# Patient Record
Sex: FEMALE | Race: WHITE | NOT HISPANIC OR LATINO | Employment: OTHER | URBAN - METROPOLITAN AREA
[De-identification: names, ages, dates, MRNs, and addresses within clinical notes are randomized per-mention and may not be internally consistent; named-entity substitution may affect disease eponyms.]

---

## 2017-12-06 ENCOUNTER — HOSPITAL ENCOUNTER (OUTPATIENT)
Dept: RADIOLOGY | Facility: HOSPITAL | Age: 62
Discharge: HOME | End: 2017-12-06
Attending: OBSTETRICS & GYNECOLOGY
Payer: COMMERCIAL

## 2017-12-06 VITALS — WEIGHT: 135 LBS | HEIGHT: 65 IN | BODY MASS INDEX: 22.49 KG/M2

## 2017-12-06 DIAGNOSIS — Z13.820 ENCOUNTER FOR IMAGING TO ASSESS OSTEOPOROSIS CHANGE: ICD-10-CM

## 2017-12-06 DIAGNOSIS — Z12.39 ENCOUNTER FOR SPECIAL SCREENING EXAMINATION FOR NEOPLASM OF BREAST: ICD-10-CM

## 2017-12-06 PROCEDURE — 77067 SCR MAMMO BI INCL CAD: CPT | Mod: 26,,, | Performed by: RADIOLOGY

## 2017-12-06 PROCEDURE — 77080 DXA BONE DENSITY AXIAL: CPT | Mod: TC,PO

## 2017-12-06 PROCEDURE — 77063 BREAST TOMOSYNTHESIS BI: CPT | Mod: 26,,, | Performed by: RADIOLOGY

## 2017-12-06 PROCEDURE — 77080 DXA BONE DENSITY AXIAL: CPT | Mod: 26,,, | Performed by: RADIOLOGY

## 2017-12-06 PROCEDURE — 77067 SCR MAMMO BI INCL CAD: CPT | Mod: TC,PO

## 2019-08-16 ENCOUNTER — HOSPITAL ENCOUNTER (OUTPATIENT)
Dept: RADIOLOGY | Facility: HOSPITAL | Age: 64
Discharge: HOME | End: 2019-08-16
Attending: OBSTETRICS & GYNECOLOGY
Payer: MEDICARE

## 2019-08-16 VITALS — BODY MASS INDEX: 22.48 KG/M2 | WEIGHT: 134.94 LBS | HEIGHT: 65 IN

## 2019-08-16 DIAGNOSIS — Z12.39 BREAST CANCER SCREENING: ICD-10-CM

## 2019-08-16 PROCEDURE — 77067 SCR MAMMO BI INCL CAD: CPT | Mod: TC,PO

## 2019-08-16 PROCEDURE — 77067 SCR MAMMO BI INCL CAD: CPT | Mod: 26,,, | Performed by: RADIOLOGY

## 2019-08-16 PROCEDURE — 77063 MAMMO DIGITAL SCREENING BILAT WITH TOMOSYNTHESIS_CAD: ICD-10-PCS | Mod: 26,,, | Performed by: RADIOLOGY

## 2019-08-16 PROCEDURE — 77063 BREAST TOMOSYNTHESIS BI: CPT | Mod: 26,,, | Performed by: RADIOLOGY

## 2019-08-16 PROCEDURE — 77067 MAMMO DIGITAL SCREENING BILAT WITH TOMOSYNTHESIS_CAD: ICD-10-PCS | Mod: 26,,, | Performed by: RADIOLOGY

## 2020-10-07 ENCOUNTER — HOSPITAL ENCOUNTER (OUTPATIENT)
Dept: RADIOLOGY | Facility: HOSPITAL | Age: 65
Discharge: HOME | End: 2020-10-07
Payer: MEDICARE

## 2020-10-07 DIAGNOSIS — Z12.31 ENCOUNTER FOR SCREENING MAMMOGRAM FOR MALIGNANT NEOPLASM OF BREAST: ICD-10-CM

## 2020-10-07 PROCEDURE — 77067 MAMMO DIGITAL SCREENING BILAT WITH TOMO: ICD-10-PCS | Mod: 26,,, | Performed by: RADIOLOGY

## 2020-10-07 PROCEDURE — 77067 SCR MAMMO BI INCL CAD: CPT | Mod: TC,PO

## 2020-10-07 PROCEDURE — 77067 SCR MAMMO BI INCL CAD: CPT | Mod: 26,,, | Performed by: RADIOLOGY

## 2020-10-07 PROCEDURE — 77063 MAMMO DIGITAL SCREENING BILAT WITH TOMO: ICD-10-PCS | Mod: 26,,, | Performed by: RADIOLOGY

## 2020-10-07 PROCEDURE — 77063 BREAST TOMOSYNTHESIS BI: CPT | Mod: 26,,, | Performed by: RADIOLOGY

## 2020-11-03 ENCOUNTER — TELEPHONE (OUTPATIENT)
Dept: FAMILY MEDICINE | Facility: CLINIC | Age: 65
End: 2020-11-03

## 2020-11-03 NOTE — TELEPHONE ENCOUNTER
----- Message from Polly Pollard sent at 11/3/2020  1:09 PM CST -----  Contact: call  pt [phone]   Type:  Sooner Apoointment Request    Caller is requesting a sooner appointment.  Caller declined first available appointment listed below.  Caller will not accept being placed on the waitlist and is requesting a message be sent to doctor.    Name of Caller: pt  When is the first available appointment?   No   schedule // coming up //  supervisor   was  not able to  also  Symptoms:    est care   would  like to be a  new pt  Best Call Back Number:  call  pt [phone]  Additional Information:    please   call to   fit  pt in

## 2021-02-11 ENCOUNTER — OFFICE VISIT (OUTPATIENT)
Dept: FAMILY MEDICINE | Facility: CLINIC | Age: 66
End: 2021-02-11
Payer: MEDICARE

## 2021-02-11 VITALS
WEIGHT: 153.25 LBS | SYSTOLIC BLOOD PRESSURE: 136 MMHG | BODY MASS INDEX: 25.53 KG/M2 | TEMPERATURE: 97 F | RESPIRATION RATE: 18 BRPM | HEIGHT: 65 IN | DIASTOLIC BLOOD PRESSURE: 78 MMHG | OXYGEN SATURATION: 98 % | HEART RATE: 77 BPM

## 2021-02-11 DIAGNOSIS — R35.0 URINARY FREQUENCY: ICD-10-CM

## 2021-02-11 DIAGNOSIS — R00.2 PALPITATIONS: Primary | ICD-10-CM

## 2021-02-11 PROCEDURE — 99203 OFFICE O/P NEW LOW 30 MIN: CPT | Mod: S$GLB,,, | Performed by: FAMILY MEDICINE

## 2021-02-11 PROCEDURE — 99203 PR OFFICE/OUTPT VISIT, NEW, LEVL III, 30-44 MIN: ICD-10-PCS | Mod: S$GLB,,, | Performed by: FAMILY MEDICINE

## 2021-02-11 PROCEDURE — 81000 URINALYSIS NONAUTO W/SCOPE: CPT | Mod: PO

## 2021-02-11 RX ORDER — AZELASTINE 1 MG/ML
2 SPRAY, METERED NASAL 2 TIMES DAILY
Qty: 30 ML | Refills: 5 | Status: SHIPPED | OUTPATIENT
Start: 2021-02-11 | End: 2022-02-23

## 2021-02-11 RX ORDER — ESTRADIOL 0.03 MG/D
FILM, EXTENDED RELEASE TRANSDERMAL
COMMUNITY
Start: 2021-01-16

## 2021-02-11 RX ORDER — PROGESTERONE 100 MG/1
100 CAPSULE ORAL NIGHTLY
COMMUNITY
Start: 2020-12-03

## 2021-02-12 LAB
BACTERIA #/AREA URNS HPF: ABNORMAL /HPF
BILIRUB UR QL STRIP: NEGATIVE
CLARITY UR: CLEAR
COLOR UR: YELLOW
GLUCOSE UR QL STRIP: NEGATIVE
HGB UR QL STRIP: ABNORMAL
KETONES UR QL STRIP: NEGATIVE
LEUKOCYTE ESTERASE UR QL STRIP: ABNORMAL
MICROSCOPIC COMMENT: ABNORMAL
NITRITE UR QL STRIP: NEGATIVE
PH UR STRIP: 7 [PH] (ref 5–8)
PROT UR QL STRIP: NEGATIVE
RBC #/AREA URNS HPF: 0 /HPF (ref 0–4)
SP GR UR STRIP: 1.01 (ref 1–1.03)
SQUAMOUS #/AREA URNS HPF: 3 /HPF
URN SPEC COLLECT METH UR: ABNORMAL
WBC #/AREA URNS HPF: 3 /HPF (ref 0–5)

## 2021-02-15 ENCOUNTER — PATIENT MESSAGE (OUTPATIENT)
Dept: FAMILY MEDICINE | Facility: CLINIC | Age: 66
End: 2021-02-15

## 2021-02-17 DIAGNOSIS — Z12.11 COLON CANCER SCREENING: ICD-10-CM

## 2021-02-22 ENCOUNTER — PATIENT OUTREACH (OUTPATIENT)
Dept: ADMINISTRATIVE | Facility: HOSPITAL | Age: 66
End: 2021-02-22

## 2021-03-09 ENCOUNTER — OFFICE VISIT (OUTPATIENT)
Dept: CARDIOLOGY | Facility: CLINIC | Age: 66
End: 2021-03-09
Payer: MEDICARE

## 2021-03-09 VITALS
BODY MASS INDEX: 27.46 KG/M2 | SYSTOLIC BLOOD PRESSURE: 173 MMHG | HEIGHT: 63 IN | DIASTOLIC BLOOD PRESSURE: 93 MMHG | WEIGHT: 155 LBS | HEART RATE: 77 BPM

## 2021-03-09 DIAGNOSIS — R00.2 PALPITATIONS: Primary | ICD-10-CM

## 2021-03-09 PROCEDURE — 99999 PR PBB SHADOW E&M-EST. PATIENT-LVL III: ICD-10-PCS | Mod: PBBFAC,,, | Performed by: INTERNAL MEDICINE

## 2021-03-09 PROCEDURE — 99204 OFFICE O/P NEW MOD 45 MIN: CPT | Mod: S$PBB,,, | Performed by: INTERNAL MEDICINE

## 2021-03-09 PROCEDURE — 93010 EKG 12-LEAD: ICD-10-PCS | Mod: S$PBB,,, | Performed by: INTERNAL MEDICINE

## 2021-03-09 PROCEDURE — 99999 PR PBB SHADOW E&M-EST. PATIENT-LVL III: CPT | Mod: PBBFAC,,, | Performed by: INTERNAL MEDICINE

## 2021-03-09 PROCEDURE — 99213 OFFICE O/P EST LOW 20 MIN: CPT | Mod: PBBFAC,PO,25 | Performed by: INTERNAL MEDICINE

## 2021-03-09 PROCEDURE — 93005 ELECTROCARDIOGRAM TRACING: CPT | Mod: PBBFAC,PO | Performed by: INTERNAL MEDICINE

## 2021-03-09 PROCEDURE — 93010 ELECTROCARDIOGRAM REPORT: CPT | Mod: S$PBB,,, | Performed by: INTERNAL MEDICINE

## 2021-03-09 PROCEDURE — 99204 PR OFFICE/OUTPT VISIT, NEW, LEVL IV, 45-59 MIN: ICD-10-PCS | Mod: S$PBB,,, | Performed by: INTERNAL MEDICINE

## 2021-03-15 LAB
CHOLEST SERPL-MSCNC: 198 MG/DL (ref 0–200)
HDLC SERPL-MCNC: 71 MG/DL (ref 35–70)
HEPATITIS C VIRUS ANTIBODY: <0.1
LDLC SERPL CALC-MCNC: 115 MG/DL (ref 0–160)
TRIGL SERPL-MCNC: 67 MG/DL (ref 40–160)

## 2021-03-22 ENCOUNTER — HOSPITAL ENCOUNTER (OUTPATIENT)
Dept: CARDIOLOGY | Facility: HOSPITAL | Age: 66
Discharge: HOME | End: 2021-03-22
Attending: INTERNAL MEDICINE
Payer: MEDICARE

## 2021-03-22 DIAGNOSIS — R00.2 PALPITATIONS: ICD-10-CM

## 2021-03-22 PROCEDURE — 93248 CV CARDIAC MONITOR - 3-14 DAY ADULT (CUPID ONLY): ICD-10-PCS | Mod: ,,, | Performed by: INTERNAL MEDICINE

## 2021-03-22 PROCEDURE — 93248 EXT ECG>7D<15D REV&INTERPJ: CPT | Mod: ,,, | Performed by: INTERNAL MEDICINE

## 2021-03-22 PROCEDURE — 93246 EXT ECG>7D<15D RECORDING: CPT | Mod: PO

## 2021-03-22 PROCEDURE — 93247 EXT ECG>7D<15D SCAN A/R: CPT | Mod: PO

## 2021-03-23 ENCOUNTER — TELEPHONE (OUTPATIENT)
Dept: CARDIOLOGY | Facility: CLINIC | Age: 66
End: 2021-03-23

## 2021-03-31 ENCOUNTER — PATIENT MESSAGE (OUTPATIENT)
Dept: FAMILY MEDICINE | Facility: CLINIC | Age: 66
End: 2021-03-31

## 2021-04-05 ENCOUNTER — PATIENT OUTREACH (OUTPATIENT)
Dept: ADMINISTRATIVE | Facility: HOSPITAL | Age: 66
End: 2021-04-05

## 2021-04-06 ENCOUNTER — PATIENT MESSAGE (OUTPATIENT)
Dept: ADMINISTRATIVE | Facility: HOSPITAL | Age: 66
End: 2021-04-06

## 2021-04-12 ENCOUNTER — HOSPITAL ENCOUNTER (OUTPATIENT)
Dept: RADIOLOGY | Facility: HOSPITAL | Age: 66
Discharge: HOME | End: 2021-04-12
Attending: OBSTETRICS & GYNECOLOGY
Payer: MEDICARE

## 2021-04-12 DIAGNOSIS — Z80.3 FAMILY HISTORY OF BREAST CANCER: ICD-10-CM

## 2021-04-12 PROCEDURE — 25500020 PHARM REV CODE 255: Mod: PO

## 2021-04-12 PROCEDURE — 77049 MRI BREAST W/WO CONTRAST, W/CAD, BILATERAL: ICD-10-PCS | Mod: 26,,, | Performed by: RADIOLOGY

## 2021-04-12 PROCEDURE — A9577 INJ MULTIHANCE: HCPCS | Mod: PO

## 2021-04-12 PROCEDURE — 77049 MRI BREAST C-+ W/CAD BI: CPT | Mod: 26,,, | Performed by: RADIOLOGY

## 2021-04-12 PROCEDURE — C8937 CAD BREAST MRI: HCPCS | Mod: TC,PO

## 2021-04-12 RX ADMIN — GADOBENATE DIMEGLUMINE 14 ML: 529 INJECTION, SOLUTION INTRAVENOUS at 03:04

## 2021-04-19 ENCOUNTER — CLINICAL SUPPORT (OUTPATIENT)
Dept: CARDIOLOGY | Facility: CLINIC | Age: 66
End: 2021-04-19
Attending: INTERNAL MEDICINE
Payer: MEDICARE

## 2021-04-19 VITALS — BODY MASS INDEX: 27.29 KG/M2 | HEIGHT: 63 IN | WEIGHT: 154 LBS

## 2021-04-19 DIAGNOSIS — R00.2 PALPITATIONS: ICD-10-CM

## 2021-04-19 LAB
ASCENDING AORTA: 3 CM
AV INDEX (PROSTH): 0.9
AV MEAN GRADIENT: 4 MMHG
AV PEAK GRADIENT: 7 MMHG
AV VALVE AREA: 2.75 CM2
AV VELOCITY RATIO: 0.89
BSA FOR ECHO PROCEDURE: 1.76 M2
CV ECHO LV RWT: 0.52 CM
DOP CALC AO PEAK VEL: 1.33 M/S
DOP CALC AO VTI: 29.22 CM
DOP CALC LVOT AREA: 3 CM2
DOP CALC LVOT DIAMETER: 1.97 CM
DOP CALC LVOT PEAK VEL: 1.19 M/S
DOP CALC LVOT STROKE VOLUME: 80.34 CM3
DOP CALCLVOT PEAK VEL VTI: 26.37 CM
E WAVE DECELERATION TIME: 214.76 MSEC
E/A RATIO: 0.71
E/E' RATIO: 9.33 M/S
ECHO LV POSTERIOR WALL: 1.02 CM (ref 0.6–1.1)
EJECTION FRACTION: 65 %
FRACTIONAL SHORTENING: 43 % (ref 28–44)
INTERVENTRICULAR SEPTUM: 1.04 CM (ref 0.6–1.1)
IVRT: 91.34 MSEC
LA MAJOR: 3.92 CM
LA MINOR: 5.01 CM
LA WIDTH: 3.51 CM
LEFT ATRIUM SIZE: 2.69 CM
LEFT ATRIUM VOLUME INDEX: 20.4 ML/M2
LEFT ATRIUM VOLUME: 35.3 CM3
LEFT INTERNAL DIMENSION IN SYSTOLE: 2.22 CM (ref 2.1–4)
LEFT VENTRICLE DIASTOLIC VOLUME INDEX: 37.82 ML/M2
LEFT VENTRICLE DIASTOLIC VOLUME: 65.42 ML
LEFT VENTRICLE MASS INDEX: 73 G/M2
LEFT VENTRICLE SYSTOLIC VOLUME INDEX: 9.6 ML/M2
LEFT VENTRICLE SYSTOLIC VOLUME: 16.57 ML
LEFT VENTRICULAR INTERNAL DIMENSION IN DIASTOLE: 3.89 CM (ref 3.5–6)
LEFT VENTRICULAR MASS: 126.88 G
LV LATERAL E/E' RATIO: 7.78 M/S
LV SEPTAL E/E' RATIO: 11.67 M/S
MV A" WAVE DURATION": 10.56 MSEC
MV PEAK A VEL: 0.99 M/S
MV PEAK E VEL: 0.7 M/S
PISA TR MAX VEL: 2.41 M/S
PULM VEIN S/D RATIO: 1.87
PV PEAK D VEL: 0.3 M/S
PV PEAK S VEL: 0.56 M/S
RA MAJOR: 4.42 CM
RA PRESSURE: 3 MMHG
RA WIDTH: 3.57 CM
RIGHT VENTRICULAR END-DIASTOLIC DIMENSION: 3.47 CM
RV TISSUE DOPPLER FREE WALL SYSTOLIC VELOCITY 1 (APICAL 4 CHAMBER VIEW): 14.12 CM/S
SINUS: 2.95 CM
STJ: 3.04 CM
TDI LATERAL: 0.09 M/S
TDI SEPTAL: 0.06 M/S
TDI: 0.08 M/S
TR MAX PG: 23 MMHG
TRICUSPID ANNULAR PLANE SYSTOLIC EXCURSION: 2.13 CM
TV REST PULMONARY ARTERY PRESSURE: 26 MMHG

## 2021-04-19 PROCEDURE — 93306 TTE W/DOPPLER COMPLETE: CPT | Mod: PBBFAC,PO | Performed by: INTERNAL MEDICINE

## 2021-04-19 PROCEDURE — 99211 OFF/OP EST MAY X REQ PHY/QHP: CPT | Mod: PBBFAC,PO,25

## 2021-04-19 PROCEDURE — 99999 PR PBB SHADOW E&M-EST. PATIENT-LVL I: ICD-10-PCS | Mod: PBBFAC,,,

## 2021-04-19 PROCEDURE — 99999 PR PBB SHADOW E&M-EST. PATIENT-LVL I: CPT | Mod: PBBFAC,,,

## 2021-04-19 PROCEDURE — 93306 ECHO (CUPID ONLY): ICD-10-PCS | Mod: 26,S$PBB,, | Performed by: INTERNAL MEDICINE

## 2021-04-22 ENCOUNTER — HOSPITAL ENCOUNTER (OUTPATIENT)
Dept: RADIOLOGY | Facility: HOSPITAL | Age: 66
Discharge: HOME | End: 2021-04-22
Attending: OBSTETRICS & GYNECOLOGY
Payer: MEDICARE

## 2021-04-22 DIAGNOSIS — D37.6 EPITHELIOID HEMANGIOENDOTHELIOMA LIVER: ICD-10-CM

## 2021-04-22 PROCEDURE — 76700 US EXAM ABDOM COMPLETE: CPT | Mod: 26,,, | Performed by: RADIOLOGY

## 2021-04-22 PROCEDURE — 76700 US EXAM ABDOM COMPLETE: CPT | Mod: TC,PO

## 2021-04-22 PROCEDURE — 76700 US ABDOMEN COMPLETE: ICD-10-PCS | Mod: 26,,, | Performed by: RADIOLOGY

## 2021-05-03 ENCOUNTER — PATIENT OUTREACH (OUTPATIENT)
Dept: ADMINISTRATIVE | Facility: HOSPITAL | Age: 66
End: 2021-05-03

## 2021-05-21 ENCOUNTER — HOSPITAL ENCOUNTER (OUTPATIENT)
Dept: RADIOLOGY | Facility: HOSPITAL | Age: 66
Discharge: HOME | End: 2021-05-21
Attending: OBSTETRICS & GYNECOLOGY
Payer: MEDICARE

## 2021-05-21 DIAGNOSIS — R16.0 LIVER MASS: ICD-10-CM

## 2021-05-21 PROCEDURE — 25500020 PHARM REV CODE 255: Mod: PO

## 2021-05-21 PROCEDURE — 74183 MRI ABD W/O CNTR FLWD CNTR: CPT | Mod: TC,PO

## 2021-05-21 PROCEDURE — A9585 GADOBUTROL INJECTION: HCPCS | Mod: PO

## 2021-05-21 PROCEDURE — 74183 MRI ABD W/O CNTR FLWD CNTR: CPT | Mod: 26,,, | Performed by: RADIOLOGY

## 2021-05-21 PROCEDURE — 74183 MRI ABDOMEN W WO CONTRAST: ICD-10-PCS | Mod: 26,,, | Performed by: RADIOLOGY

## 2021-05-21 RX ORDER — GADOBUTROL 604.72 MG/ML
7 INJECTION INTRAVENOUS
Status: COMPLETED | OUTPATIENT
Start: 2021-05-21 | End: 2021-05-21

## 2021-05-21 RX ADMIN — GADOBUTROL 7 ML: 604.72 INJECTION INTRAVENOUS at 11:05

## 2021-08-13 ENCOUNTER — OFFICE VISIT (OUTPATIENT)
Dept: CARDIOLOGY | Facility: CLINIC | Age: 66
End: 2021-08-13
Payer: MEDICARE

## 2021-08-13 DIAGNOSIS — R00.2 PALPITATIONS: Primary | ICD-10-CM

## 2021-08-13 PROCEDURE — 99214 OFFICE O/P EST MOD 30 MIN: CPT | Mod: 95,,, | Performed by: INTERNAL MEDICINE

## 2021-08-13 PROCEDURE — 99214 PR OFFICE/OUTPT VISIT, EST, LEVL IV, 30-39 MIN: ICD-10-PCS | Mod: 95,,, | Performed by: INTERNAL MEDICINE

## 2022-02-23 RX ORDER — AZELASTINE 1 MG/ML
2 SPRAY, METERED NASAL 2 TIMES DAILY
Qty: 90 ML | Refills: 0 | Status: SHIPPED | OUTPATIENT
Start: 2022-02-23

## 2022-03-18 ENCOUNTER — PES CALL (OUTPATIENT)
Dept: ADMINISTRATIVE | Facility: CLINIC | Age: 67
End: 2022-03-18
Payer: MEDICARE

## 2022-04-18 ENCOUNTER — TELEPHONE (OUTPATIENT)
Dept: RADIOLOGY | Facility: HOSPITAL | Age: 67
End: 2022-04-18
Payer: MEDICARE

## 2022-04-18 NOTE — TELEPHONE ENCOUNTER
Received outside paper order from Dr. Crowley for screening mammogram. Called patient, scheduled for 4/20/22 at 3 pm at [address] [facility] radiology

## 2022-04-20 ENCOUNTER — HOSPITAL ENCOUNTER (OUTPATIENT)
Dept: RADIOLOGY | Facility: HOSPITAL | Age: 67
Discharge: HOME | End: 2022-04-20
Attending: OBSTETRICS & GYNECOLOGY
Payer: MEDICARE

## 2022-04-20 DIAGNOSIS — Z12.31 BREAST CANCER SCREENING BY MAMMOGRAM: ICD-10-CM

## 2022-04-20 PROCEDURE — 77063 BREAST TOMOSYNTHESIS BI: CPT | Mod: 26,,, | Performed by: RADIOLOGY

## 2022-04-20 PROCEDURE — 77063 BREAST TOMOSYNTHESIS BI: CPT | Mod: TC,PO

## 2022-04-20 PROCEDURE — 77067 MAMMO DIGITAL SCREENING BILAT WITH TOMO: ICD-10-PCS | Mod: 26,,, | Performed by: RADIOLOGY

## 2022-04-20 PROCEDURE — 77067 SCR MAMMO BI INCL CAD: CPT | Mod: TC,PO

## 2022-04-20 PROCEDURE — 77063 MAMMO DIGITAL SCREENING BILAT WITH TOMO: ICD-10-PCS | Mod: 26,,, | Performed by: RADIOLOGY

## 2022-04-20 PROCEDURE — 77067 SCR MAMMO BI INCL CAD: CPT | Mod: 26,,, | Performed by: RADIOLOGY

## 2022-05-31 ENCOUNTER — PATIENT MESSAGE (OUTPATIENT)
Dept: ADMINISTRATIVE | Facility: HOSPITAL | Age: 67
End: 2022-05-31
Payer: MEDICARE

## 2022-06-28 ENCOUNTER — TELEPHONE (OUTPATIENT)
Dept: FAMILY MEDICINE | Facility: CLINIC | Age: 67
End: 2022-06-28
Payer: MEDICARE

## 2022-07-07 ENCOUNTER — TELEPHONE (OUTPATIENT)
Dept: CARDIOLOGY | Facility: CLINIC | Age: 67
End: 2022-07-07
Payer: MEDICARE

## 2022-07-07 NOTE — TELEPHONE ENCOUNTER
[employer] [facility] message sent to reschedule   Provider Procedure Text (E): After obtaining clear surgical margins the defect was repaired by another provider.

## 2022-08-05 NOTE — TELEPHONE ENCOUNTER
No new care gaps identified.  Powered by [employer]. Reference number: [id number].   2/22/2022 12:07:29 AM CST  
Refill Authorization Note   Paulina Agustin  is requesting a refill authorization.  Brief Assessment and Rationale for Refill:  Approve     Medication Therapy Plan:       Medication Reconciliation Completed: No   Comments:   --->Care Gap information included below if applicable.   Orders Placed This Encounter    azelastine (ASTELIN) 137 mcg (0.1 %) nasal spray      Requested Prescriptions   Signed Prescriptions Disp Refills    azelastine (ASTELIN) 137 mcg (0.1 %) nasal spray 90 mL 0     Si sprays (274 mcg total) by Nasal route 2 (two) times daily.       Ear, Nose, and Throat: Nasal Preparations - Antiallergy Passed - 2022 12:06 AM        Passed - Patient is at least 18 years old        Passed - Valid encounter within last 15 months     Recent Visits  Date Type Provider Dept   21 Office Visit Sury Stewart MD [facility]   Showing recent visits within past 720 days and meeting all other requirements  Future Appointments  No visits were found meeting these conditions.  Showing future appointments within next 150 days and meeting all other requirements      Future Appointments              In 5 months Steve Figueroa MD [place] [facility], [place]                    Appointments  past 12m or future 3m with PCP    Date Provider   Last Visit   2021 Sury Stewart MD   Next Visit   Visit date not found Sury Stewart MD   ED visits in past 90 days: 0     Note composed:1:52 PM 2022         
no

## 2022-08-31 DIAGNOSIS — Z78.0 MENOPAUSE: ICD-10-CM

## 2022-09-07 ENCOUNTER — PATIENT MESSAGE (OUTPATIENT)
Dept: ADMINISTRATIVE | Facility: HOSPITAL | Age: 67
End: 2022-09-07
Payer: MEDICARE

## 2022-09-07 ENCOUNTER — PATIENT OUTREACH (OUTPATIENT)
Dept: ADMINISTRATIVE | Facility: HOSPITAL | Age: 67
End: 2022-09-07
Payer: MEDICARE

## 2022-09-09 ENCOUNTER — TELEPHONE (OUTPATIENT)
Dept: FAMILY MEDICINE | Facility: CLINIC | Age: 67
End: 2022-09-09
Payer: MEDICARE

## 2022-09-09 DIAGNOSIS — R79.9 ABNORMAL FINDING OF BLOOD CHEMISTRY, UNSPECIFIED: ICD-10-CM

## 2022-09-09 DIAGNOSIS — R89.9 ABNORMAL LABORATORY TEST RESULT: Primary | ICD-10-CM

## 2022-09-09 NOTE — TELEPHONE ENCOUNTER
----- Message from Tiffanie Felix sent at 9/9/2022  3:00 PM CDT -----  Type:  Sooner Appointment Request    Caller is requesting a sooner appointment.  Caller declined first available appointment listed below.  Caller will not accept being placed on the waitlist and is requesting a message be sent to doctor.    Name of Caller:  pt  When is the first available appointment?  12/12  Symptoms:  need orders for blood work orders she keep passing out --would like the blood work first and then an appt so she can speak to the dr--please call and advise  Best Call Back Number:  [phone] (home)     Additional Information:  thank you       Name band;

## 2022-09-09 NOTE — TELEPHONE ENCOUNTER
Returned call to patient.  She is unable to come in next week as she will be out of town.  Was able to schedule on 9/26.  Patient had several episodes of this when she was younger but not for over thirty years.  Has now had three episodes of passing out after meals.  [facility]  CBC,CMP, LIPID, TSH, T3, T4 AND B12.  Please add if any additional labs needed.  Patient did request to have diabetes labs eval.

## 2022-09-11 ENCOUNTER — PATIENT MESSAGE (OUTPATIENT)
Dept: FAMILY MEDICINE | Facility: CLINIC | Age: 67
End: 2022-09-11
Payer: MEDICARE

## 2022-09-13 LAB
CHOLEST SERPL-MSCNC: 195 MG/DL (ref 0–200)
HBA1C MFR BLD: 5.5 % (ref 4.8–5.6)
HDLC SERPL-MCNC: 86 MG/DL (ref 35–70)
LDLC SERPL CALC-MCNC: 114 MG/DL (ref 0–160)
TRIGL SERPL-MCNC: 84 MG/DL (ref 40–160)

## 2022-09-14 ENCOUNTER — PATIENT MESSAGE (OUTPATIENT)
Dept: FAMILY MEDICINE | Facility: CLINIC | Age: 67
End: 2022-09-14
Payer: MEDICARE

## 2022-09-26 ENCOUNTER — OFFICE VISIT (OUTPATIENT)
Dept: FAMILY MEDICINE | Facility: CLINIC | Age: 67
End: 2022-09-26
Payer: MEDICARE

## 2022-09-26 ENCOUNTER — TELEPHONE (OUTPATIENT)
Dept: FAMILY MEDICINE | Facility: CLINIC | Age: 67
End: 2022-09-26

## 2022-09-26 VITALS
HEIGHT: 63 IN | TEMPERATURE: 99 F | OXYGEN SATURATION: 99 % | HEART RATE: 81 BPM | RESPIRATION RATE: 20 BRPM | BODY MASS INDEX: 27.54 KG/M2 | WEIGHT: 155.44 LBS | SYSTOLIC BLOOD PRESSURE: 126 MMHG | DIASTOLIC BLOOD PRESSURE: 78 MMHG

## 2022-09-26 DIAGNOSIS — K86.2 PANCREAS CYST: ICD-10-CM

## 2022-09-26 DIAGNOSIS — I10 HYPERTENSION, UNSPECIFIED TYPE: Primary | ICD-10-CM

## 2022-09-26 DIAGNOSIS — R55 SYNCOPE, UNSPECIFIED SYNCOPE TYPE: ICD-10-CM

## 2022-09-26 PROCEDURE — 93005 EKG 12-LEAD: ICD-10-PCS | Mod: S$GLB,,, | Performed by: FAMILY MEDICINE

## 2022-09-26 PROCEDURE — 93010 EKG 12-LEAD: ICD-10-PCS | Mod: S$PBB,,, | Performed by: INTERNAL MEDICINE

## 2022-09-26 PROCEDURE — 93005 ELECTROCARDIOGRAM TRACING: CPT | Mod: S$GLB,,, | Performed by: FAMILY MEDICINE

## 2022-09-26 PROCEDURE — 90694 VACC AIIV4 NO PRSRV 0.5ML IM: CPT | Mod: S$GLB,,, | Performed by: FAMILY MEDICINE

## 2022-09-26 PROCEDURE — G0008 FLU VACCINE - QUADRIVALENT - ADJUVANTED: ICD-10-PCS | Mod: S$GLB,,, | Performed by: FAMILY MEDICINE

## 2022-09-26 PROCEDURE — 99214 OFFICE O/P EST MOD 30 MIN: CPT | Mod: S$GLB,,, | Performed by: FAMILY MEDICINE

## 2022-09-26 PROCEDURE — 93010 ELECTROCARDIOGRAM REPORT: CPT | Mod: S$PBB,,, | Performed by: INTERNAL MEDICINE

## 2022-09-26 PROCEDURE — G0008 ADMIN INFLUENZA VIRUS VAC: HCPCS | Mod: S$GLB,,, | Performed by: FAMILY MEDICINE

## 2022-09-26 PROCEDURE — 99214 PR OFFICE/OUTPT VISIT, EST, LEVL IV, 30-39 MIN: ICD-10-PCS | Mod: S$GLB,,, | Performed by: FAMILY MEDICINE

## 2022-09-26 PROCEDURE — 90694 FLU VACCINE - QUADRIVALENT - ADJUVANTED: ICD-10-PCS | Mod: S$GLB,,, | Performed by: FAMILY MEDICINE

## 2022-09-26 RX ORDER — CLONIDINE HYDROCHLORIDE 0.1 MG/1
0.1 TABLET ORAL
Status: COMPLETED | OUTPATIENT
Start: 2022-09-26 | End: 2022-09-26

## 2022-09-26 RX ORDER — VALSARTAN 80 MG/1
80 TABLET ORAL DAILY
Qty: 30 TABLET | Refills: 1 | Status: SHIPPED | OUTPATIENT
Start: 2022-09-26 | End: 2022-09-30

## 2022-09-26 RX ADMIN — CLONIDINE HYDROCHLORIDE 0.1 MG: 0.1 TABLET ORAL at 10:09

## 2022-09-26 NOTE — PROGRESS NOTES
Orthostatic vital signs obtained as ordered.  Lyin/102 p 66  Sittin/96 p 65  Standin/100 p 68    Clonidine 0.1 mg administered as ordered at 1018, bp recheck at 1045 was 192/100.  Of note, patient had just ambulated to the bathroom.  Recheck at 1110 was 154/90.

## 2022-09-26 NOTE — TELEPHONE ENCOUNTER
----- Message from Shawnee Crowell sent at 9/26/2022 11:08 AM CDT -----  Regarding: EKG ORDER  Please place and link EKG order to the EKG or Doctor appointment scheduled on 09/26/22 thanks. Shawnee [phone]

## 2022-09-28 DIAGNOSIS — I10 HYPERTENSION, UNSPECIFIED TYPE: ICD-10-CM

## 2022-09-30 ENCOUNTER — CLINICAL SUPPORT (OUTPATIENT)
Dept: FAMILY MEDICINE | Facility: CLINIC | Age: 67
End: 2022-09-30
Payer: MEDICARE

## 2022-09-30 ENCOUNTER — TELEPHONE (OUTPATIENT)
Dept: FAMILY MEDICINE | Facility: CLINIC | Age: 67
End: 2022-09-30

## 2022-09-30 VITALS — HEART RATE: 77 BPM | SYSTOLIC BLOOD PRESSURE: 152 MMHG | DIASTOLIC BLOOD PRESSURE: 84 MMHG

## 2022-09-30 RX ORDER — VALSARTAN 160 MG/1
160 TABLET ORAL DAILY
Qty: 30 TABLET | Refills: 0 | Status: SHIPPED | OUTPATIENT
Start: 2022-09-30 | End: 2022-11-07

## 2022-09-30 NOTE — TELEPHONE ENCOUNTER
Patient in today for blood pressure check.  Home readings of 150/87, 123/78, 113/72.  Today took med at 1140 as she forgot to take it this am.  Initial reading was 148/88 p 77 and after additional 15 minutes reading was 152/84.  Home machine initial reading was 159/98 and second reading was 166/94.  Patient plans to get a new machine.  Please advise.  Of note, patient is having some anxiety regarding her blood pressure and has also been waking up early.  Denies any other complaints.

## 2022-09-30 NOTE — PROGRESS NOTES
,      Blood pressure reading after 15 minutes was 148/88, Pulse 77. Home machine 159/98. Recheck was 152/84 with home machine 166/94.  Message sent to provider.        Paulina Agustin 67 y.o. female is here today for Blood Pressure check.   History of HTN yes.    Review of patient's allergies indicates:  No Known Allergies  Creatinine   Date Value Ref Range Status   04/12/2021 0.9 0.5 - 1.4 mg/dL Final   ]  Patient verifies taking blood pressure medications on a regular basis at the same time of the day.     Current Outpatient Medications:     azelastine (ASTELIN) 137 mcg (0.1 %) nasal spray, 2 sprays (274 mcg total) by Nasal route 2 (two) times daily., Disp: 90 mL, Rfl: 0    estradioL (VIVELLE-DOT) 0.025 mg/24 hr, APPLY 1 PATCH TO ABDOMEN TWICE WEEKLY, Disp: , Rfl:     progesterone (PROMETRIUM) 100 MG capsule, Take 100 mg by mouth nightly., Disp: , Rfl:     valsartan (DIOVAN) 80 MG tablet, Take 1 tablet (80 mg total) by mouth once daily., Disp: 30 tablet, Rfl: 1  Does patient have record of home blood pressure readings yes. Readings have been averaging 123/78, 113/72.   Last dose of blood pressure medication was taken at 1130.  Patient is asymptomatic.   Complains of being anxious.

## 2022-10-02 NOTE — PROGRESS NOTES
Subjective:       Patient ID: Paulina Agustin is a 67 y.o. female.    Chief Complaint: Loss of Consciousness    HPI  The patient is a 67-year-old who is here today to discuss recent fainting episodes.  She used to faint as a child and as a young adult in her 20s and 30s.  She remembers cracking her head open when she was 12-year-old old and had a fainting episode.  She has not had any recent fainting episodes since after her pregnancy with her 32-year-old daughter until recently.  In December, after eating a heavy meal at a dinner party, she felt nauseated and felt like she was about to faint which she did.  People at the dinner party reported that she was was not how long and had no seizure activity.  In the end of March, she had just returned from a 10 day trip to [facility].  She had a 6:00 p.m. dinner with her daughter at a bar.  She again felt as if she was going to faint so she sat down on the floor with her back against the wall.  When she fainted, her neck felt forward and cut off her airways and she had some convulsing.  She recovered quickly from this event and was able to go on about the evening.  In July, she was in [place] and was eating dinner late at 9:30 p.m. and had another event.  Of note, she has seen Cardiology previously in March of 2021 for palpitations.  At that time, she had a Holter and echo which were unremarkable.  Since that time, she has changed her coffee and chocolate intake and has not had any further issues with palpitations.      Aside from this, she has been doing fairly well.  She did have COVID in May.  She did have 4 days of fever but otherwise did well.    She has been working with her gynecologist Dr. Crowley with HRT.  She is currently taking estradiol and progesterone.  She brings in labs done with Dr. Velasquez.  These labs included a normal CBC, CMP, fasting lipid profile, TSH, T4, B12, TPO, and T3 from earlier this month which were all normal.  She did have her mammogram in  April.  She will be having her breast MRI in October.    Of note, on her last breast MRI, she was noted to have liver lesions and so an MRI of the abdomen is recommended.  She had her MRI of the abdomen which showed small hepatic hemangiomas and to pancreatic cysts.  A repeat MRI of the pancreatic cysts was recommended in May of 2023 and she will have this done with Dr. Crowley       Review of Systems   Constitutional:  Negative for appetite change, chills, diaphoresis, fatigue, fever and unexpected weight change.   HENT:  Negative for congestion, dental problem, ear pain, hearing loss, postnasal drip, rhinorrhea, sneezing, sore throat and trouble swallowing.    Eyes:  Negative for photophobia, pain, discharge and visual disturbance.   Respiratory:  Negative for cough, chest tightness, shortness of breath and wheezing.    Cardiovascular:  Negative for chest pain, palpitations and leg swelling.   Gastrointestinal:  Negative for abdominal distention, abdominal pain, blood in stool, constipation, diarrhea, nausea and vomiting.   Endocrine: Negative for cold intolerance, heat intolerance, polydipsia and polyuria.   Genitourinary:  Negative for dysuria, flank pain, frequency, genital sores, hematuria, menstrual problem and vaginal discharge.   Musculoskeletal:  Negative for arthralgias, joint swelling and myalgias.   Skin:  Negative for rash.   Neurological:  Positive for syncope. Negative for dizziness, light-headedness and headaches.   Hematological:  Negative for adenopathy. Does not bruise/bleed easily.   Psychiatric/Behavioral:  Negative for dysphoric mood, self-injury, sleep disturbance and suicidal ideas. The patient is not nervous/anxious.      Objective:      Physical Exam  Constitutional:       General: She is not in acute distress.     Appearance: Normal appearance. She is well-developed.   HENT:      Head: Normocephalic and atraumatic.      Right Ear: Hearing, tympanic membrane, ear canal and external ear  normal.      Left Ear: Hearing, tympanic membrane, ear canal and external ear normal.      Nose: Nose normal.      Mouth/Throat:      Mouth: No oral lesions.      Pharynx: No oropharyngeal exudate or posterior oropharyngeal erythema.   Eyes:      General: Lids are normal. No scleral icterus.     Extraocular Movements: Extraocular movements intact.      Conjunctiva/sclera: Conjunctivae normal.      Pupils: Pupils are equal, round, and reactive to light.   Neck:      Thyroid: No thyroid mass or thyromegaly.      Vascular: No carotid bruit.   Cardiovascular:      Rate and Rhythm: Normal rate and regular rhythm. No extrasystoles are present.     Chest Wall: PMI is not displaced.      Heart sounds: Normal heart sounds. No murmur heard.    No gallop.   Pulmonary:      Effort: Pulmonary effort is normal. No accessory muscle usage or respiratory distress.      Breath sounds: Normal breath sounds.   Abdominal:      General: Bowel sounds are normal. There is no abdominal bruit.      Palpations: Abdomen is soft.      Tenderness: There is no abdominal tenderness. There is no rebound.   Musculoskeletal:      Cervical back: Normal range of motion and neck supple.   Lymphadenopathy:      Head:      Right side of head: No submental or submandibular adenopathy.      Left side of head: No submental or submandibular adenopathy.      Cervical:      Right cervical: No superficial, deep or posterior cervical adenopathy.     Left cervical: No superficial, deep or posterior cervical adenopathy.      Upper Body:      Right upper body: No supraclavicular adenopathy.      Left upper body: No supraclavicular adenopathy.   Skin:     General: Skin is warm and dry.   Neurological:      Mental Status: She is alert and oriented to person, place, and time.      Cranial Nerves: No cranial nerve deficit.      Sensory: No sensory deficit.   Psychiatric:         Speech: Speech normal.         Behavior: Behavior normal.         Thought Content: Thought  "content normal.     Blood pressure 126/78, pulse 81, temperature 98.7 °F (37.1 °C), resp. rate 20, height 5' 3" (1.6 m), weight 70.5 kg (155 lb 6.8 oz), SpO2 99 %.Body mass index is 27.53 kg/m².      EKG is unremarkable  Orthostatic vital signs are as follows:  Supine 178/102 with a pulse of 66  Seated 170/96 with a pulse of 65  Standing 170/100 with a pulse of 68    She did receive clonidine 0.1 mg for her hypertension at 10:18 a.m..  Her blood pressure improved to 154/90 at 11:10 a.m.        A/P:  1) syncope likely due to vasovagal after eating.  She will eat small frequent meals throughout the day and if she has a late heavy meal  so will be cognizant of this potential.  We did discuss that if she feels faint, lying flat on the ground and elevating her legs would be the best position.  I will update her cardiologist and see if he has any further recommendations  2) HRT.  Follow-up with Dr. Crowley and continue with hormones under her direction  3) high risk for breast cancer.  She will continue to have her mammograms in April and her MRIs in October under the direction of Dr. Crowley  4) pancreatic cysts.  She is due for repeat CT in May of 2023 will have this done with Dr. Crowley        5) hypertension.  New.  We are going to start Diovan 80 mg once a day.  She will return next week have her blood pressures checked by the nurses  6) health maintenance issues.  She did receive her flu shot today    "

## 2022-10-06 DIAGNOSIS — I10 HYPERTENSION, UNSPECIFIED TYPE: ICD-10-CM

## 2022-10-12 ENCOUNTER — PATIENT OUTREACH (OUTPATIENT)
Dept: ADMINISTRATIVE | Facility: HOSPITAL | Age: 67
End: 2022-10-12
Payer: MEDICARE

## 2022-10-12 DIAGNOSIS — I10 HYPERTENSION, UNSPECIFIED TYPE: ICD-10-CM

## 2022-10-18 RX ORDER — VALSARTAN 80 MG/1
80 TABLET ORAL DAILY
Qty: 30 TABLET | Refills: 1 | OUTPATIENT
Start: 2022-10-18 | End: 2023-10-18

## 2022-10-18 NOTE — TELEPHONE ENCOUNTER
Care Due:                  Date            Visit Type   Department     Provider  --------------------------------------------------------------------------------                                EP -                              PRIMARY      [facility]    Sury Stewart  Last Visit: 09-      CARE ([facility])   MEDICINE       Anger  Next Visit: None Scheduled  None         None Found                                                            Last  Test          Frequency    Reason                     Performed    Due Date  --------------------------------------------------------------------------------    CMP.........  12 months..  valsartan................  Not Found    Overdue    [employer] Embedded Care Gaps. Reference number: [id number]. 10/18/2022   12:32:17 PM CDT

## 2022-10-18 NOTE — TELEPHONE ENCOUNTER
Refill Decision Note   Paulina Agustin  is requesting a refill authorization.  Brief Assessment and Rationale for Refill:  Quick Discontinue    -Medication-Related Problems Identified: Requires labs  Medication Therapy Plan:  D/C 9/30/2022    Medication Reconciliation Completed: No   Comments:     No Care Gaps recommended.     Note composed:2:20 PM 10/18/2022

## 2022-10-19 ENCOUNTER — OFFICE VISIT (OUTPATIENT)
Dept: CARDIOLOGY | Facility: CLINIC | Age: 67
End: 2022-10-19
Payer: MEDICARE

## 2022-10-19 ENCOUNTER — PATIENT OUTREACH (OUTPATIENT)
Dept: ADMINISTRATIVE | Facility: HOSPITAL | Age: 67
End: 2022-10-19
Payer: MEDICARE

## 2022-10-19 ENCOUNTER — PATIENT MESSAGE (OUTPATIENT)
Dept: ADMINISTRATIVE | Facility: HOSPITAL | Age: 67
End: 2022-10-19
Payer: MEDICARE

## 2022-10-19 VITALS
HEIGHT: 63 IN | WEIGHT: 154.31 LBS | BODY MASS INDEX: 27.34 KG/M2 | SYSTOLIC BLOOD PRESSURE: 146 MMHG | DIASTOLIC BLOOD PRESSURE: 92 MMHG | HEART RATE: 72 BPM

## 2022-10-19 DIAGNOSIS — I10 HYPERTENSION, UNSPECIFIED TYPE: ICD-10-CM

## 2022-10-19 DIAGNOSIS — R55 SYNCOPE, UNSPECIFIED SYNCOPE TYPE: Primary | ICD-10-CM

## 2022-10-19 PROCEDURE — 99213 OFFICE O/P EST LOW 20 MIN: CPT | Mod: PBBFAC,PO | Performed by: PHYSICIAN ASSISTANT

## 2022-10-19 PROCEDURE — 99214 PR OFFICE/OUTPT VISIT, EST, LEVL IV, 30-39 MIN: ICD-10-PCS | Mod: S$PBB,,, | Performed by: PHYSICIAN ASSISTANT

## 2022-10-19 PROCEDURE — 99999 PR PBB SHADOW E&M-EST. PATIENT-LVL III: CPT | Mod: PBBFAC,,, | Performed by: PHYSICIAN ASSISTANT

## 2022-10-19 PROCEDURE — 99214 OFFICE O/P EST MOD 30 MIN: CPT | Mod: S$PBB,,, | Performed by: PHYSICIAN ASSISTANT

## 2022-10-19 PROCEDURE — 99999 PR PBB SHADOW E&M-EST. PATIENT-LVL III: ICD-10-PCS | Mod: PBBFAC,,, | Performed by: PHYSICIAN ASSISTANT

## 2022-10-19 RX ORDER — SODIUM CHLORIDE 0.9 % (FLUSH) 0.9 %
10 SYRINGE (ML) INJECTION
Status: CANCELLED | OUTPATIENT
Start: 2022-10-19

## 2022-10-19 NOTE — PROGRESS NOTES
"Subjective:    Patient ID:  Paulina Agustin is a 67 y.o. female who presents for follow-up of Follow-up and Hypertension        HPI  Ms. Agustin is a very pleasant lady who follows with Dr. Figueroa. She presents today for routine follow up. She has been having some issues with elevated blood pressures -- Dr. Stewart is managing this and her Diovan was recently increased.     She mentions several episodes of syncope. She "passed out all the time" as a child and young woman, but episodes stopped after she gave birth to her daughter (more than 30 years ago). In December 2020, she had a particularly large meal at [employer] and had a syncopal episode. It happened again in March 2022 while eating with her daughter and, most recently, in July 2022. She does have a prodrome (nausea, generally feeling unwell). She is unconscious for a few seconds. No loss of bowel or bladder. She does feel euphoric when she regains consciousness. She states her daughter reported some convulsions during one of her episodes, but she has otherwise never had those.     She had an echo last year that revealed:  The left ventricle is normal in size with concentric remodeling and normal systolic function.  The estimated ejection fraction is 60-65%.  Normal left ventricular diastolic function.  Normal right ventricular size with normal right ventricular systolic function.  Normal central venous pressure (3 mmHg).  The estimated PA systolic pressure is 26 mmHg.    Zio patch in March 2021 revealed:   Date of Activation: 3/22/2021 Date of Deactivation: 4/3/2021 Total Analysis Time: 12 days and 5 hours Heart rates varied between 53 and 135 bpm with an average of 79 bpm. - Predominant rhythm: NSR - Atrial Tachycardia (AT) 1 episode, Longest/Fastest 6 beats @ Avg 120 bpm up to 137 bpm - Ectopic Atrial Rhythm (EAR) - PAC 1.10 %    Her last lipid panel showed an LDL of 114, but HDL was 86. Trig 84.     Review of Systems   Constitutional: Negative for chills, " "diaphoresis, fever, weight gain and weight loss.   HENT:  Negative for sore throat.    Eyes:  Negative for blurred vision, vision loss in left eye, vision loss in right eye and visual disturbance.   Cardiovascular:  Positive for syncope. Negative for chest pain, claudication, dyspnea on exertion, leg swelling, near-syncope, orthopnea, palpitations and paroxysmal nocturnal dyspnea.   Respiratory:  Negative for cough, hemoptysis, shortness of breath, sputum production and wheezing.    Endocrine: Negative for cold intolerance and heat intolerance.   Hematologic/Lymphatic: Negative for adenopathy. Does not bruise/bleed easily.   Skin:  Negative for rash.   Musculoskeletal:  Negative for falls, muscle weakness and myalgias.   Gastrointestinal:  Negative for abdominal pain, change in bowel habit, constipation, diarrhea, melena and nausea.   Genitourinary:  Negative for bladder incontinence.   Neurological:  Negative for dizziness, focal weakness, headaches, light-headedness, numbness and weakness.   Psychiatric/Behavioral:  Negative for altered mental status.       Vitals:    10/19/22 1350   BP: (!) 146/92   BP Location: Left arm   Patient Position: Sitting   BP Method: Medium (Automatic)   Pulse: 72   Weight: 70 kg (154 lb 5.2 oz)   Height: 5' 3" (1.6 m)   Body mass index is 27.34 kg/m².    Objective:    Physical Exam  Constitutional:       General: She is not in acute distress.     Appearance: She is well-developed.   HENT:      Head: Normocephalic and atraumatic.   Eyes:      General: No scleral icterus.     Conjunctiva/sclera: Conjunctivae normal.      Pupils: Pupils are equal, round, and reactive to light.   Neck:      Vascular: No JVD.      Trachea: No tracheal deviation.   Cardiovascular:      Rate and Rhythm: Normal rate and regular rhythm.      Heart sounds: No murmur heard.    No friction rub. No gallop.   Pulmonary:      Effort: Pulmonary effort is normal. No respiratory distress.      Breath sounds: Normal " breath sounds. No wheezing or rales.   Chest:      Chest wall: No tenderness.   Abdominal:      General: Bowel sounds are normal. There is no distension.      Palpations: Abdomen is soft.      Tenderness: There is no abdominal tenderness.   Musculoskeletal:         General: No tenderness.      Cervical back: Neck supple.   Skin:     General: Skin is warm and dry.      Findings: No erythema or rash.   Neurological:      Mental Status: She is alert and oriented to person, place, and time.   Psychiatric:         Behavior: Behavior normal.         Assessment:       Problem List Items Addressed This Visit          Cardiology Problems    HTN (hypertension)     Other Visit Diagnoses       Syncope, unspecified syncope type    -  Primary    Relevant Orders    Case Request-Cath Lab: Insertion, Implantable Loop Recorder (Completed)               Plan:       Likely vasovagal syncope by history, but we discussed ILR implant for further evaluation and she would like to proceed.   Risks, benefits, and alternatives of procedure were discussed in detail with the patient. Questions were answered. She is agreeable to proceed.  Management of HTN per Dr. Stewart.   Risk factor modification including diet and exercise.   F/U with Dr. Figueroa in 4 months.

## 2022-10-19 NOTE — PROGRESS NOTES
Uncontrolled BP REPORT  BP Readings from Last 3 Encounters:   09/30/22 (!) 152/84   09/26/22 126/78   03/09/21 (!) 173/93       Non-compliant report chart audits for HYPERTENSION MANAGEMENT     Outreach to patient in reference to hypertension management       NEED REMOTE HOME BP READING DOCUMENTED   OR  BP FOLLOW UP WITH NURSE VISIT OR CARE TEAM MEMBER

## 2022-10-19 NOTE — PATIENT INSTRUCTIONS
Loop Recorder Insertion    Arrive for your procedure at: [facility] ON 10/24/22. YOUR PROCEDURE IS AT 1200 NOON WITH DR RENETTA BELL    NO PREP IS NECESSARY    The Medtronic loop recorder monitors a persons heart rate 24 hours a day and has a battery life of up to 3 years.  The device is approximately one-third of the size of a AAA battery and is also safe for use in an MRI.  It is placed beneath the skin through a small incision in the left upper side of the chest wall.  The procedure is minimally invasive so anesthesia is not needed to implant the device.  The procedure can be done in about 10 minutes.      You may eat or drink the day of the procedure.    Please take all of your medications on the day of scheduled procedure.    This WILL NOT require anesthesia or an IV.    You DO NOT need someone to drive you home.

## 2022-10-24 ENCOUNTER — DOCUMENTATION ONLY (OUTPATIENT)
Dept: CARDIOLOGY | Facility: HOSPITAL | Age: 67
End: 2022-10-24
Payer: MEDICARE

## 2022-10-24 DIAGNOSIS — Z95.818 STATUS POST PLACEMENT OF IMPLANTABLE LOOP RECORDER: Primary | ICD-10-CM

## 2022-10-24 DIAGNOSIS — R55 SYNCOPE, UNSPECIFIED SYNCOPE TYPE: ICD-10-CM

## 2022-10-26 DIAGNOSIS — I10 HTN (HYPERTENSION): ICD-10-CM

## 2022-11-01 ENCOUNTER — CLINICAL SUPPORT (OUTPATIENT)
Dept: CARDIOLOGY | Facility: HOSPITAL | Age: 67
End: 2022-11-01
Attending: INTERNAL MEDICINE
Payer: MEDICARE

## 2022-11-01 DIAGNOSIS — Z95.818 STATUS POST PLACEMENT OF IMPLANTABLE LOOP RECORDER: ICD-10-CM

## 2022-11-01 DIAGNOSIS — R55 SYNCOPE, UNSPECIFIED SYNCOPE TYPE: ICD-10-CM

## 2022-11-01 PROCEDURE — 93285 PRGRMG DEV EVAL SCRMS IP: CPT | Mod: 26,,, | Performed by: INTERNAL MEDICINE

## 2022-11-01 PROCEDURE — 93285 CARDIAC DEVICE CHECK - IN CLINIC & HOSPITAL: ICD-10-PCS | Mod: 26,,, | Performed by: INTERNAL MEDICINE

## 2022-11-03 DIAGNOSIS — I10 HTN (HYPERTENSION): ICD-10-CM

## 2022-11-03 NOTE — TELEPHONE ENCOUNTER
Refill Routing Note   Medication(s) are not appropriate for processing by [facility] for the following reason(s):      - Required laboratory values are outdated  - Required vitals are abnormal    [facility] action(s):  Defer Medication-related problems identified: Requires labs        Medication reconciliation completed: No     Appointments  past 12m or future 3m with PCP    Date Provider   Last Visit   9/26/2022 Sury Stewart MD   Next Visit   Visit date not found Sury Stewart MD   ED visits in past 90 days: 0        Note composed:1:40 PM 11/03/2022

## 2022-11-03 NOTE — TELEPHONE ENCOUNTER
No new care gaps identified.  [facility] Embedded Care Gaps. Reference number: [id number]. 11/03/2022   9:30:43 AM CDT

## 2022-11-07 RX ORDER — VALSARTAN 160 MG/1
TABLET ORAL
Qty: 30 TABLET | Refills: 0 | OUTPATIENT
Start: 2022-11-07

## 2022-11-07 RX ORDER — VALSARTAN 320 MG/1
320 TABLET ORAL DAILY
Qty: 30 TABLET | Refills: 1 | Status: SHIPPED | OUTPATIENT
Start: 2022-11-07 | End: 2022-12-02

## 2022-11-14 ENCOUNTER — TELEPHONE (OUTPATIENT)
Dept: FAMILY MEDICINE | Facility: CLINIC | Age: 67
End: 2022-11-14
Payer: MEDICARE

## 2022-11-14 ENCOUNTER — PATIENT OUTREACH (OUTPATIENT)
Dept: ADMINISTRATIVE | Facility: HOSPITAL | Age: 67
End: 2022-11-14
Payer: MEDICARE

## 2022-11-14 DIAGNOSIS — I10 HTN (HYPERTENSION): ICD-10-CM

## 2022-11-14 NOTE — PROGRESS NOTES
2022 Care Everywhere updates requested and reviewed.  Immunizations reconciled. Media reports reviewed.  Duplicate HM overrides and  orders removed.  Overdue HM topic chart audit and/or requested.  Overdue lab testing linked to upcoming lab appointments if applies.  Uncontrolled BP REPORT  BP Readings from Last 3 Encounters:   10/24/22 (!) 157/88   10/19/22 (!) 146/92   22 (!) 152/84     Health Maintenance Due   Topic Date Due    DEXA Scan  2020    Pneumococcal Vaccines (Age 65+) (2 - PPSV23 if available, else PCV20) 2021    COVID-19 Vaccine (4 - Booster for Pfizer series) 2022

## 2022-11-14 NOTE — TELEPHONE ENCOUNTER
----- Message from Rain Bob sent at 11/14/2022 12:32 PM CST -----  Regarding: f/u appointment  Contact: patient  Type:  Sooner Appointment Request    Caller is requesting a sooner appointment.  Caller declined first available appointment listed below.  Caller will not accept being placed on the waitlist and is requesting a message be sent to doctor.    Name of Caller:  patient  When is the first available appointment?    Symptoms:  2 to 3 week f/u blood pressure  Best Call Back Number:  [phone] (home)   Additional Information:  Patient set one for Jennifer Adrián but would like to see doctor since she was wanting to see her. Please call patient before scheduling. Thanks!

## 2022-11-15 NOTE — TELEPHONE ENCOUNTER
How is the BP?  As I am out of the office in the next 2 wks, I could see her in Dec if her BP is better and that works for her

## 2022-11-16 ENCOUNTER — PATIENT MESSAGE (OUTPATIENT)
Dept: FAMILY MEDICINE | Facility: CLINIC | Age: 67
End: 2022-11-16
Payer: MEDICARE

## 2022-11-23 RX ORDER — METOPROLOL SUCCINATE 50 MG/1
50 TABLET, EXTENDED RELEASE ORAL DAILY
Qty: 30 TABLET | Refills: 1 | Status: SHIPPED | OUTPATIENT
Start: 2022-11-23 | End: 2022-12-20

## 2022-12-07 DIAGNOSIS — I10 HTN (HYPERTENSION): ICD-10-CM

## 2022-12-27 DIAGNOSIS — I10 HTN (HYPERTENSION): ICD-10-CM

## 2022-12-27 DIAGNOSIS — R92.30 BREAST DENSITY: Primary | ICD-10-CM

## 2022-12-28 ENCOUNTER — HOSPITAL ENCOUNTER (OUTPATIENT)
Dept: RADIOLOGY | Facility: HOSPITAL | Age: 67
Discharge: HOME | End: 2022-12-28
Attending: OBSTETRICS & GYNECOLOGY
Payer: MEDICARE

## 2022-12-28 DIAGNOSIS — R92.30 BREAST DENSITY: ICD-10-CM

## 2022-12-28 PROCEDURE — A9577 INJ MULTIHANCE: HCPCS | Mod: PO

## 2022-12-28 PROCEDURE — 77049 MRI BREAST C-+ W/CAD BI: CPT | Mod: TC,PO

## 2022-12-28 PROCEDURE — 77049 MRI BREAST C-+ W/CAD BI: CPT | Mod: 26,,, | Performed by: RADIOLOGY

## 2022-12-28 PROCEDURE — 77049 MRI BREAST W/WO CONTRAST, W/CAD, BILATERAL: ICD-10-PCS | Mod: 26,,, | Performed by: RADIOLOGY

## 2022-12-28 PROCEDURE — 25500020 PHARM REV CODE 255: Mod: PO

## 2022-12-28 RX ADMIN — GADOBENATE DIMEGLUMINE 14 ML: 529 INJECTION, SOLUTION INTRAVENOUS at 11:12

## 2022-12-31 NOTE — TELEPHONE ENCOUNTER
Care Due:                  Date            Visit Type   Department     Provider  --------------------------------------------------------------------------------                                EP -                              PRIMARY      [facility]    Sury Stewart  Last Visit: 09-      CARE ([facility])   MEDICINE       Anger  Next Visit: None Scheduled  None         None Found                                                            Last  Test          Frequency    Reason                     Performed    Due Date  --------------------------------------------------------------------------------    K...........  12 months..  valsartan................  Not Found    Overdue    [employer] Embedded Care Gaps. Reference number: [id number]. 12/31/2022   7:30:45 AM CST

## 2023-01-01 NOTE — TELEPHONE ENCOUNTER
Refill Routing Note   Medication(s) are not appropriate for processing by [facility] for the following reason(s):      - Required laboratory values are outdated  - Required vitals are abnormal    [facility] action(s):  Defer Medication-related problems identified: Requires labs        Medication reconciliation completed: No     Appointments  past 12m or future 3m with PCP    Date Provider   Last Visit   9/26/2022 Sury Stewart MD   Next Visit   Visit date not found Sury Stewart MD   ED visits in past 90 days: 0        Note composed:8:55 PM 12/31/2022

## 2023-01-05 ENCOUNTER — CLINICAL SUPPORT (OUTPATIENT)
Dept: CARDIOLOGY | Facility: HOSPITAL | Age: 68
End: 2023-01-05
Payer: MEDICARE

## 2023-01-05 DIAGNOSIS — Z95.818 PRESENCE OF OTHER CARDIAC IMPLANTS AND GRAFTS: ICD-10-CM

## 2023-01-05 PROCEDURE — 93298 REM INTERROG DEV EVAL SCRMS: CPT | Mod: ,,, | Performed by: INTERNAL MEDICINE

## 2023-01-05 PROCEDURE — 93298 CARDIAC DEVICE CHECK - REMOTE: ICD-10-PCS | Mod: ,,, | Performed by: INTERNAL MEDICINE

## 2023-01-07 RX ORDER — VALSARTAN 320 MG/1
TABLET ORAL
Qty: 90 TABLET | Refills: 0 | Status: SHIPPED | OUTPATIENT
Start: 2023-01-07 | End: 2023-02-28

## 2023-01-09 ENCOUNTER — PATIENT MESSAGE (OUTPATIENT)
Dept: FAMILY MEDICINE | Facility: CLINIC | Age: 68
End: 2023-01-09
Payer: MEDICARE

## 2023-01-10 ENCOUNTER — PATIENT MESSAGE (OUTPATIENT)
Dept: FAMILY MEDICINE | Facility: CLINIC | Age: 68
End: 2023-01-10
Payer: MEDICARE

## 2023-01-30 ENCOUNTER — HOSPITAL ENCOUNTER (OUTPATIENT)
Dept: RADIOLOGY | Facility: HOSPITAL | Age: 68
Discharge: HOME | End: 2023-01-30
Attending: SPECIALIST
Payer: MEDICARE

## 2023-01-30 DIAGNOSIS — K86.2 PANCREAS CYST: ICD-10-CM

## 2023-01-30 PROCEDURE — 25500020 PHARM REV CODE 255: Mod: PO | Performed by: SPECIALIST

## 2023-01-30 PROCEDURE — 74183 MRI ABDOMEN W WO CONTRAST: ICD-10-PCS | Mod: 26,,, | Performed by: RADIOLOGY

## 2023-01-30 PROCEDURE — A9585 GADOBUTROL INJECTION: HCPCS | Mod: PO | Performed by: SPECIALIST

## 2023-01-30 PROCEDURE — 74183 MRI ABD W/O CNTR FLWD CNTR: CPT | Mod: TC,PO

## 2023-01-30 PROCEDURE — 74183 MRI ABD W/O CNTR FLWD CNTR: CPT | Mod: 26,,, | Performed by: RADIOLOGY

## 2023-01-30 RX ORDER — GADOBUTROL 604.72 MG/ML
6 INJECTION INTRAVENOUS
Status: COMPLETED | OUTPATIENT
Start: 2023-01-30 | End: 2023-01-30

## 2023-01-30 RX ADMIN — GADOBUTROL 6 ML: 604.72 INJECTION INTRAVENOUS at 09:01

## 2023-02-04 ENCOUNTER — CLINICAL SUPPORT (OUTPATIENT)
Dept: CARDIOLOGY | Facility: HOSPITAL | Age: 68
End: 2023-02-04
Payer: MEDICARE

## 2023-02-04 DIAGNOSIS — Z95.818 PRESENCE OF OTHER CARDIAC IMPLANTS AND GRAFTS: ICD-10-CM

## 2023-02-09 ENCOUNTER — OFFICE VISIT (OUTPATIENT)
Dept: FAMILY MEDICINE | Facility: CLINIC | Age: 68
End: 2023-02-09
Payer: MEDICARE

## 2023-02-09 VITALS
TEMPERATURE: 98 F | HEIGHT: 63 IN | RESPIRATION RATE: 20 BRPM | DIASTOLIC BLOOD PRESSURE: 68 MMHG | SYSTOLIC BLOOD PRESSURE: 128 MMHG | WEIGHT: 158.19 LBS | BODY MASS INDEX: 28.03 KG/M2 | HEART RATE: 67 BPM | OXYGEN SATURATION: 97 %

## 2023-02-09 DIAGNOSIS — I10 HYPERTENSION, ESSENTIAL: Primary | ICD-10-CM

## 2023-02-09 DIAGNOSIS — R55 SYNCOPE, UNSPECIFIED SYNCOPE TYPE: ICD-10-CM

## 2023-02-09 DIAGNOSIS — R11.0 NAUSEA: ICD-10-CM

## 2023-02-09 PROCEDURE — G0009 PNEUMOCOCCAL CONJUGATE VACCINE 20-VALENT: ICD-10-PCS | Mod: S$GLB,,, | Performed by: FAMILY MEDICINE

## 2023-02-09 PROCEDURE — 90677 PCV20 VACCINE IM: CPT | Mod: S$GLB,,, | Performed by: FAMILY MEDICINE

## 2023-02-09 PROCEDURE — G0009 ADMIN PNEUMOCOCCAL VACCINE: HCPCS | Mod: S$GLB,,, | Performed by: FAMILY MEDICINE

## 2023-02-09 PROCEDURE — 99214 PR OFFICE/OUTPT VISIT, EST, LEVL IV, 30-39 MIN: ICD-10-PCS | Mod: S$GLB,,, | Performed by: FAMILY MEDICINE

## 2023-02-09 PROCEDURE — 99214 OFFICE O/P EST MOD 30 MIN: CPT | Mod: S$GLB,,, | Performed by: FAMILY MEDICINE

## 2023-02-09 PROCEDURE — 90677 PNEUMOCOCCAL CONJUGATE VACCINE 20-VALENT: ICD-10-PCS | Mod: S$GLB,,, | Performed by: FAMILY MEDICINE

## 2023-02-09 NOTE — PATIENT INSTRUCTIONS
sad light therapy lamp  (Sad = seasonal affective disorder from lack of sunlight in the winter months)

## 2023-02-27 NOTE — PROGRESS NOTES
"Subjective:    Patient ID:  Paulina Agustin is a 67 y.o. female who presents for follow-up of Palpitations      Problem List Items Addressed This Visit          Cardiac/Vascular    Primary hypertension - Primary     Other Visit Diagnoses       Palpitations                HPI    Patient was last seen on 08/13/2021 at which time she was doing well from a cardiac standpoint.    On assessment today, the patient states that she feels OK.     No chest pain.  No shortness of breath.  Exercising more    Further palpitations - No    BP has been improving, getting some low numbers, 95 and 100 systolic, feeling sleepy.       Objective:     Vitals:    02/28/23 0919   BP: 118/75   BP Location: Left arm   Patient Position: Sitting   BP Method: Medium (Automatic)   Pulse: 61   Weight: 71.7 kg (158 lb 1.1 oz)   Height: 5' 3" (1.6 m)        Physical Exam  Vitals and nursing note reviewed.   Constitutional:       General: She is not in acute distress.     Appearance: She is well-developed.   HENT:      Head: Normocephalic and atraumatic.   Neck:      Vascular: No JVD.   Cardiovascular:      Rate and Rhythm: Normal rate and regular rhythm.      Heart sounds: Normal heart sounds. No murmur heard.    No friction rub. No gallop.   Pulmonary:      Effort: Pulmonary effort is normal. No respiratory distress.      Breath sounds: Normal breath sounds. No wheezing or rales.   Abdominal:      General: Bowel sounds are normal.      Palpations: Abdomen is soft.      Tenderness: There is no abdominal tenderness. There is no guarding or rebound.   Musculoskeletal:         General: No tenderness.      Cervical back: Neck supple.   Skin:     General: Skin is warm and dry.   Neurological:      Mental Status: She is alert and oriented to person, place, and time.   Psychiatric:         Behavior: Behavior normal.           Current Outpatient Medications   Medication Instructions    azelastine (ASTELIN) 274 mcg, Nasal, 2 times daily    cholecalciferol " (vitamin D3) 5,000 Units, Oral, Daily    estradioL (VIVELLE-DOT) 0.025 mg/24 hr APPLY 1 PATCH TO ABDOMEN TWICE WEEKLY    metoprolol succinate (TOPROL-XL) 50 MG 24 hr tablet TAKE 1 TABLET BY MOUTH EVERY DAY    progesterone (PROMETRIUM) 100 mg, Oral, Nightly    valsartan (DIOVAN) 320 MG tablet TAKE 1 TABLET BY MOUTH EVERY DAY       Lipid Panel:   Lab Results   Component Value Date    CHOL 195 09/13/2022    HDL 86 (A) 09/13/2022    LDLCALC 114 09/13/2022    TRIG 84 09/13/2022       The 10-year ASCVD risk score (Chrissie CHANEY, et al., 2019) is: 7.6%    Values used to calculate the score:      Age: 67 years      Sex: Female      Is Non- : No      Diabetic: No      Tobacco smoker: No      Systolic Blood Pressure: 118 mmHg      Is BP treated: Yes      HDL Cholesterol: 86 mg/dL      Total Cholesterol: 195 mg/dL    All pertinent labs, imaging, and EKGs reviewed.  Patient's most recent EKG tracing was personally interpreted by this provider.    Most Recent EKG Results  Results for orders placed or performed in visit on 09/26/22   IN OFFICE EKG 12-LEAD (to [place])    Collection Time: 09/26/22  8:27 AM    Narrative    Test Reason : I10,    Vent. Rate : 064 BPM     Atrial Rate : 064 BPM     P-R Int : 168 ms          QRS Dur : 078 ms      QT Int : 402 ms       P-R-T Axes : 067 025 051 degrees     QTc Int : 414 ms    Normal sinus rhythm  Normal ECG  When compared with ECG of 09-MAR-2021 11:14,  No significant change was found  Confirmed by Mor Bentley MD [id number]) on 9/26/2022 3:09:45 PM    Referred By: MD MILAGROS AWAN           Confirmed By:Mor Bentley MD       Most Recent Echocardiogram Results  Results for orders placed in visit on 04/19/21    Echo Color Flow Doppler? Yes    Interpretation Summary  · The left ventricle is normal in size with concentric remodeling and normal systolic function.  · The estimated ejection fraction is 60-65%.  · Normal left ventricular diastolic function.  · Normal right  ventricular size with normal right ventricular systolic function.  · Normal central venous pressure (3 mmHg).  · The estimated PA systolic pressure is 26 mmHg.      Most Recent Nuclear Stress Test Results  No results found for this or any previous visit.      Most Recent Cardiac PET Stress Test Results  No results found for this or any previous visit.      Most Recent Cardiovascular Angiogram results  No results found for this or any previous visit.      Other Most Recent Cardiology Results  Results for orders placed in visit on 02/04/23    Cardiac device check - Remote    Narrative  Battery and Leads (BL)  Normal battery parameters    Presenting Rhythm (WA)  Normal Sinus Rhythm    Arrhythmic events (AE)  False Tachy event(s) recorded due to oversensing    Transmission Information (TI)  Clinical Alert Report  Implant indication: Syncope  Device Summary Report    Follow Up (FU)  Continue remote monitoring with monthly reporting    Additional Comments  ILR Report  Monitoring period:  1/3/23-02/02/23  Battery Status: Good        Assessment:       1. Primary hypertension    2. Palpitations         Plan:     Symptoms OK today, BP running on the low end  BP/Pulse OK today  Most recent echocardiogram reviewed personally     Emphasized to hydration   Minimize caffeine/stress   Routine cardiovascular exercise   Continue metoprolol succinate 50 mg PO Daily   Reduce valsartan to 160mg PO Daily and monitor Home BP log    Continue other cardiac medications  Mediterranean Diet/Cardiovascular Exercise Program    Patient queried and all questions were answered.    F/u in 1 year to reassess      Signed:    Steve Figueroa MD  2/28/2023 7:42 AM

## 2023-02-28 ENCOUNTER — OFFICE VISIT (OUTPATIENT)
Dept: CARDIOLOGY | Facility: CLINIC | Age: 68
End: 2023-02-28
Payer: MEDICARE

## 2023-02-28 VITALS
SYSTOLIC BLOOD PRESSURE: 118 MMHG | BODY MASS INDEX: 28 KG/M2 | WEIGHT: 158.06 LBS | DIASTOLIC BLOOD PRESSURE: 75 MMHG | HEART RATE: 61 BPM | HEIGHT: 63 IN

## 2023-02-28 DIAGNOSIS — R00.2 PALPITATIONS: ICD-10-CM

## 2023-02-28 DIAGNOSIS — I10 PRIMARY HYPERTENSION: Primary | ICD-10-CM

## 2023-02-28 PROCEDURE — 99999 PR PBB SHADOW E&M-EST. PATIENT-LVL III: CPT | Mod: PBBFAC,,, | Performed by: INTERNAL MEDICINE

## 2023-02-28 PROCEDURE — 99213 OFFICE O/P EST LOW 20 MIN: CPT | Mod: PBBFAC,PO | Performed by: INTERNAL MEDICINE

## 2023-02-28 PROCEDURE — 99999 PR PBB SHADOW E&M-EST. PATIENT-LVL III: ICD-10-PCS | Mod: PBBFAC,,, | Performed by: INTERNAL MEDICINE

## 2023-02-28 PROCEDURE — 99214 PR OFFICE/OUTPT VISIT, EST, LEVL IV, 30-39 MIN: ICD-10-PCS | Mod: S$PBB,,, | Performed by: INTERNAL MEDICINE

## 2023-02-28 PROCEDURE — 99214 OFFICE O/P EST MOD 30 MIN: CPT | Mod: S$PBB,,, | Performed by: INTERNAL MEDICINE

## 2023-02-28 RX ORDER — VALSARTAN 320 MG/1
160 TABLET ORAL DAILY
Qty: 45 TABLET | Refills: 3 | Status: SHIPPED | OUTPATIENT
Start: 2023-02-28 | End: 2023-04-15

## 2023-03-06 ENCOUNTER — CLINICAL SUPPORT (OUTPATIENT)
Dept: CARDIOLOGY | Facility: HOSPITAL | Age: 68
End: 2023-03-06
Payer: MEDICARE

## 2023-03-06 DIAGNOSIS — Z95.818 PRESENCE OF OTHER CARDIAC IMPLANTS AND GRAFTS: ICD-10-CM

## 2023-03-06 PROCEDURE — 93298 CARDIAC DEVICE CHECK - REMOTE: ICD-10-PCS | Mod: ,,, | Performed by: INTERNAL MEDICINE

## 2023-03-06 PROCEDURE — 93298 REM INTERROG DEV EVAL SCRMS: CPT | Mod: ,,, | Performed by: INTERNAL MEDICINE

## 2023-03-21 ENCOUNTER — TELEPHONE (OUTPATIENT)
Dept: CARDIOLOGY | Facility: CLINIC | Age: 68
End: 2023-03-21
Payer: MEDICARE

## 2023-03-21 NOTE — TELEPHONE ENCOUNTER
Pt scheduled for EGD with dilation. Please advise.   Sent pt. Portal message asking if she can come in 8-31 for her colposcopy in [facility]

## 2023-03-23 NOTE — PROGRESS NOTES
Subjective:       Patient ID: Paulina Agustin is a 67 y.o. female.    Chief Complaint: Follow-up    HPI  The patient is a 67-year-old who is here today for follow-up.  Overall, she is doing well.  Regarding her hypertension, she is taking Diovan and Toprol.  Toprol was added in November because she was having high blood pressure readings with the Diovan alone.  With the Toprol and the Diovan, she has been having good blood pressure readings.  Today her blood pressure is 128/68.    Of note, since I have seen her last, she has had an ILR placed due to syncope and presyncope.  She is following with the cardiology team regularly.  She is not had any syncope or presyncope since having the ILR placed.    Review of systems is remarkable for nausea.  She has had nausea for the past 2 days and she never has nausea.  She wonders if this might be from her blood pressure medicines.    Review of Systems   Constitutional:  Negative for appetite change, chills, diaphoresis, fatigue, fever and unexpected weight change.   HENT:  Negative for congestion, ear pain, postnasal drip, rhinorrhea, sinus pressure, sneezing, sore throat and trouble swallowing.    Eyes:  Negative for pain, discharge and visual disturbance.   Respiratory:  Negative for cough, chest tightness, shortness of breath and wheezing.    Cardiovascular:  Negative for chest pain, palpitations and leg swelling.   Gastrointestinal:  Positive for nausea. Negative for abdominal distention, abdominal pain, blood in stool, constipation, diarrhea and vomiting.   Skin:  Negative for rash.       Objective:      Physical Exam  Constitutional:       General: She is not in acute distress.     Appearance: Normal appearance. She is well-developed.   HENT:      Head: Normocephalic and atraumatic.      Right Ear: Hearing, tympanic membrane, ear canal and external ear normal.      Left Ear: Hearing, tympanic membrane, ear canal and external ear normal.      Nose: Nose normal.       "Mouth/Throat:      Mouth: No oral lesions.      Pharynx: No oropharyngeal exudate or posterior oropharyngeal erythema.   Eyes:      General: Lids are normal. No scleral icterus.     Extraocular Movements: Extraocular movements intact.      Conjunctiva/sclera: Conjunctivae normal.      Pupils: Pupils are equal, round, and reactive to light.   Neck:      Thyroid: No thyroid mass or thyromegaly.      Vascular: No carotid bruit.   Cardiovascular:      Rate and Rhythm: Normal rate and regular rhythm. No extrasystoles are present.     Chest Wall: PMI is not displaced.      Heart sounds: Normal heart sounds. No murmur heard.    No gallop.   Pulmonary:      Effort: Pulmonary effort is normal. No accessory muscle usage or respiratory distress.      Breath sounds: Normal breath sounds.   Abdominal:      General: Bowel sounds are normal. There is no abdominal bruit.      Palpations: Abdomen is soft.      Tenderness: There is no abdominal tenderness. There is no rebound.   Musculoskeletal:      Cervical back: Normal range of motion and neck supple.   Lymphadenopathy:      Head:      Right side of head: No submental or submandibular adenopathy.      Left side of head: No submental or submandibular adenopathy.      Cervical:      Right cervical: No superficial, deep or posterior cervical adenopathy.     Left cervical: No superficial, deep or posterior cervical adenopathy.      Upper Body:      Right upper body: No supraclavicular adenopathy.      Left upper body: No supraclavicular adenopathy.   Skin:     General: Skin is warm and dry.   Neurological:      Mental Status: She is alert and oriented to person, place, and time.     Blood pressure 128/68, pulse 67, temperature 98.2 °F (36.8 °C), resp. rate 20, height 5' 3" (1.6 m), weight 71.7 kg (158 lb 2.9 oz), SpO2 97 %.Body mass index is 28.02 kg/m².            A/P:  1) hypertension.  Well controlled.  Continue with Diovan and Toprol   2)  History of presyncope/syncope.  Follow-up " with Cardiology as planned  3) nausea.  New.  If this persists, she will let me know evaluate this further  4)  health maintenance issues.  She will receive Prevnar 20 today

## 2023-04-05 ENCOUNTER — CLINICAL SUPPORT (OUTPATIENT)
Dept: CARDIOLOGY | Facility: HOSPITAL | Age: 68
End: 2023-04-05
Payer: MEDICARE

## 2023-04-05 DIAGNOSIS — Z95.818 PRESENCE OF OTHER CARDIAC IMPLANTS AND GRAFTS: ICD-10-CM

## 2023-04-25 ENCOUNTER — PATIENT MESSAGE (OUTPATIENT)
Dept: FAMILY MEDICINE | Facility: CLINIC | Age: 68
End: 2023-04-25
Payer: MEDICARE

## 2023-04-25 RX ORDER — VALSARTAN 80 MG/1
80 TABLET ORAL DAILY
Qty: 30 TABLET | Refills: 1 | Status: SHIPPED | OUTPATIENT
Start: 2023-04-25 | End: 2023-05-27

## 2023-05-05 ENCOUNTER — CLINICAL SUPPORT (OUTPATIENT)
Dept: CARDIOLOGY | Facility: HOSPITAL | Age: 68
End: 2023-05-05
Payer: MEDICARE

## 2023-05-05 DIAGNOSIS — Z95.818 PRESENCE OF OTHER CARDIAC IMPLANTS AND GRAFTS: ICD-10-CM

## 2023-05-05 PROCEDURE — 93298 CARDIAC DEVICE CHECK - REMOTE: ICD-10-PCS | Mod: ,,, | Performed by: INTERNAL MEDICINE

## 2023-05-05 PROCEDURE — 93298 REM INTERROG DEV EVAL SCRMS: CPT | Mod: ,,, | Performed by: INTERNAL MEDICINE

## 2023-05-16 ENCOUNTER — PATIENT MESSAGE (OUTPATIENT)
Dept: FAMILY MEDICINE | Facility: CLINIC | Age: 68
End: 2023-05-16
Payer: MEDICARE

## 2023-05-27 ENCOUNTER — PATIENT MESSAGE (OUTPATIENT)
Dept: FAMILY MEDICINE | Facility: CLINIC | Age: 68
End: 2023-05-27
Payer: MEDICARE

## 2023-05-27 RX ORDER — VALSARTAN 40 MG/1
40 TABLET ORAL DAILY
Qty: 30 TABLET | Refills: 1 | Status: SHIPPED | OUTPATIENT
Start: 2023-05-27 | End: 2023-06-19 | Stop reason: SDUPTHER

## 2023-06-04 ENCOUNTER — CLINICAL SUPPORT (OUTPATIENT)
Dept: CARDIOLOGY | Facility: HOSPITAL | Age: 68
End: 2023-06-04
Payer: MEDICARE

## 2023-06-04 DIAGNOSIS — Z95.818 PRESENCE OF OTHER CARDIAC IMPLANTS AND GRAFTS: ICD-10-CM

## 2023-06-08 ENCOUNTER — PATIENT MESSAGE (OUTPATIENT)
Dept: FAMILY MEDICINE | Facility: CLINIC | Age: 68
End: 2023-06-08
Payer: MEDICARE

## 2023-06-10 RX ORDER — MECLIZINE HYDROCHLORIDE 25 MG/1
25 TABLET ORAL 3 TIMES DAILY PRN
Qty: 30 TABLET | Refills: 0 | Status: SHIPPED | OUTPATIENT
Start: 2023-06-10 | End: 2023-08-14

## 2023-06-19 NOTE — TELEPHONE ENCOUNTER
No care due was identified.  [facility] Embedded Care Due Messages. Reference number: [id number].   6/19/2023 5:30:25 PM CDT

## 2023-06-20 NOTE — TELEPHONE ENCOUNTER
Refill Routing Note   Medication(s) are not appropriate for processing by [facility] for the following reason(s):      Required labs outdated  New or recently adjusted medication    [facility] action(s):  Defer Care Due:  None identified          Appointments  past 12m or future 3m with PCP    Date Provider   Last Visit   2/9/2023 Sury Stewart MD   Next Visit   8/14/2023 Sury Stewart MD   ED visits in past 90 days: 0        Note composed:8:35 PM 06/19/2023

## 2023-06-21 RX ORDER — VALSARTAN 40 MG/1
40 TABLET ORAL DAILY
Qty: 30 TABLET | Refills: 1 | Status: SHIPPED | OUTPATIENT
Start: 2023-06-21 | End: 2023-07-16

## 2023-07-04 ENCOUNTER — CLINICAL SUPPORT (OUTPATIENT)
Dept: CARDIOLOGY | Facility: HOSPITAL | Age: 68
End: 2023-07-04
Payer: MEDICARE

## 2023-07-04 DIAGNOSIS — Z95.818 PRESENCE OF OTHER CARDIAC IMPLANTS AND GRAFTS: ICD-10-CM

## 2023-07-04 PROCEDURE — 93298 CARDIAC DEVICE CHECK - REMOTE: ICD-10-PCS | Mod: 26,,, | Performed by: INTERNAL MEDICINE

## 2023-07-04 PROCEDURE — 93298 REM INTERROG DEV EVAL SCRMS: CPT | Mod: 26,,, | Performed by: INTERNAL MEDICINE

## 2023-07-14 NOTE — TELEPHONE ENCOUNTER
Refill Routing Note   Medication(s) are not appropriate for processing by [facility] for the following reason(s):      Required labs outdated    [facility] action(s):  Defer Care Due:  None identified            Appointments  past 12m or future 3m with PCP    Date Provider   Last Visit   2/9/2023 Sury Stewart MD   Next Visit   8/14/2023 Sury Stewart MD   ED visits in past 90 days: 0        Note composed:10:39 AM 07/14/2023

## 2023-07-14 NOTE — TELEPHONE ENCOUNTER
No care due was identified.  [facility] Embedded Care Due Messages. Reference number: [id number].   7/14/2023 7:32:41 AM CDT

## 2023-07-16 RX ORDER — VALSARTAN 40 MG/1
TABLET ORAL
Qty: 30 TABLET | Refills: 1 | Status: SHIPPED | OUTPATIENT
Start: 2023-07-16 | End: 2023-08-14

## 2023-08-03 ENCOUNTER — CLINICAL SUPPORT (OUTPATIENT)
Dept: CARDIOLOGY | Facility: HOSPITAL | Age: 68
End: 2023-08-03
Payer: MEDICARE

## 2023-08-03 DIAGNOSIS — Z95.818 PRESENCE OF OTHER CARDIAC IMPLANTS AND GRAFTS: ICD-10-CM

## 2023-08-11 ENCOUNTER — HOSPITAL ENCOUNTER (OUTPATIENT)
Dept: RADIOLOGY | Facility: HOSPITAL | Age: 68
Discharge: HOME | End: 2023-08-11
Attending: FAMILY MEDICINE
Payer: MEDICARE

## 2023-08-11 ENCOUNTER — HOSPITAL ENCOUNTER (OUTPATIENT)
Dept: RADIOLOGY | Facility: HOSPITAL | Age: 68
Discharge: HOME | End: 2023-08-11
Attending: OBSTETRICS & GYNECOLOGY
Payer: MEDICARE

## 2023-08-11 ENCOUNTER — PATIENT MESSAGE (OUTPATIENT)
Dept: FAMILY MEDICINE | Facility: CLINIC | Age: 68
End: 2023-08-11
Payer: MEDICARE

## 2023-08-11 DIAGNOSIS — Z78.0 MENOPAUSE: ICD-10-CM

## 2023-08-11 DIAGNOSIS — Z13.820 OSTEOPOROSIS SCREENING: ICD-10-CM

## 2023-08-11 DIAGNOSIS — Z12.31 ENCOUNTER FOR SCREENING MAMMOGRAM FOR MALIGNANT NEOPLASM OF BREAST: ICD-10-CM

## 2023-08-11 PROCEDURE — 77067 SCR MAMMO BI INCL CAD: CPT | Mod: TC,PO

## 2023-08-11 PROCEDURE — 77063 MAMMO DIGITAL SCREENING BILAT WITH TOMO: ICD-10-PCS | Mod: 26,,, | Performed by: RADIOLOGY

## 2023-08-11 PROCEDURE — 77063 BREAST TOMOSYNTHESIS BI: CPT | Mod: 26,,, | Performed by: RADIOLOGY

## 2023-08-11 PROCEDURE — 77080 DXA BONE DENSITY AXIAL SKELETON 1 OR MORE SITES: ICD-10-PCS | Mod: 26,,, | Performed by: RADIOLOGY

## 2023-08-11 PROCEDURE — 77067 SCR MAMMO BI INCL CAD: CPT | Mod: 26,,, | Performed by: RADIOLOGY

## 2023-08-11 PROCEDURE — 77080 DXA BONE DENSITY AXIAL: CPT | Mod: TC,PO

## 2023-08-11 PROCEDURE — 77067 MAMMO DIGITAL SCREENING BILAT WITH TOMO: ICD-10-PCS | Mod: 26,,, | Performed by: RADIOLOGY

## 2023-08-11 PROCEDURE — 77080 DXA BONE DENSITY AXIAL: CPT | Mod: 26,,, | Performed by: RADIOLOGY

## 2023-08-14 ENCOUNTER — OFFICE VISIT (OUTPATIENT)
Dept: FAMILY MEDICINE | Facility: CLINIC | Age: 68
End: 2023-08-14
Payer: MEDICARE

## 2023-08-14 VITALS
HEART RATE: 73 BPM | WEIGHT: 157.94 LBS | HEIGHT: 63 IN | SYSTOLIC BLOOD PRESSURE: 112 MMHG | TEMPERATURE: 98 F | DIASTOLIC BLOOD PRESSURE: 70 MMHG | OXYGEN SATURATION: 98 % | RESPIRATION RATE: 16 BRPM | BODY MASS INDEX: 27.98 KG/M2

## 2023-08-14 DIAGNOSIS — K59.00 CONSTIPATION, UNSPECIFIED CONSTIPATION TYPE: ICD-10-CM

## 2023-08-14 DIAGNOSIS — I10 HYPERTENSION, ESSENTIAL: Primary | ICD-10-CM

## 2023-08-14 DIAGNOSIS — A04.8 H. PYLORI INFECTION: ICD-10-CM

## 2023-08-14 PROCEDURE — 99214 PR OFFICE/OUTPT VISIT, EST, LEVL IV, 30-39 MIN: ICD-10-PCS | Mod: S$GLB,,, | Performed by: FAMILY MEDICINE

## 2023-08-14 PROCEDURE — 99214 OFFICE O/P EST MOD 30 MIN: CPT | Mod: S$GLB,,, | Performed by: FAMILY MEDICINE

## 2023-08-14 NOTE — PATIENT INSTRUCTIONS
Please contact the gastroenterologist to schedule an appointment.      [facility]  [address]  [address] [facility], [place], [place]. [id number] [phone]

## 2023-08-14 NOTE — PROGRESS NOTES
Subjective:       Patient ID: Paulina Agustin is a 68 y.o. female.    Chief Complaint: Follow-up    HPI  The patient is a 68-year-old who is here today for follow-up.  Overall, she is doing well except for ongoing GI issues.    Regarding her GI issues, in early June, she was treated by Dr. Mayes for H pylori found at the time of endoscopy.  This treatment was hard to tolerate and since the treatment she has been bothered by constipation and diarrhea.  She initially  had constipation for month.  Then she developed diarrhea and ondered if she may have had a GI bug that was circulating.  Now she is back to constipation.  She is taking fiber.  We did discuss revisiting with GI which she will consider.  Her last colonoscopy was 2 years ago and is due again next year.    Regarding hypertension, her blood pressure has been running low.  Today her readings 112/70.  She has had systolic readings into the 90s.  She is currently taking Diovan 40 mg once a day and Toprol XL 50 mg once a day     She recently had her visit with Dr. Crowley.  She had her Pap smear which was normal.  She had her mammogram this morning.  She has labs for mid September    Of note, she still has her ILD.  She has not had any presyncope or syncope.  She does continue follow with Cardiology.    Review of Systems   Constitutional:  Negative for appetite change, chills, diaphoresis, fatigue, fever and unexpected weight change.   HENT:  Negative for congestion, ear pain, postnasal drip, rhinorrhea, sinus pressure, sneezing, sore throat and trouble swallowing.    Eyes:  Negative for pain, discharge and visual disturbance.   Respiratory:  Negative for cough, chest tightness, shortness of breath and wheezing.    Cardiovascular:  Negative for chest pain, palpitations and leg swelling.   Gastrointestinal:  Positive for constipation. Negative for abdominal distention, abdominal pain, blood in stool, diarrhea, nausea and vomiting.   Skin:  Negative for rash.          Objective:      Physical Exam  Constitutional:       General: She is not in acute distress.     Appearance: Normal appearance. She is well-developed.   HENT:      Head: Normocephalic and atraumatic.      Right Ear: Hearing, tympanic membrane, ear canal and external ear normal.      Left Ear: Hearing, tympanic membrane, ear canal and external ear normal.      Nose: Nose normal.      Mouth/Throat:      Mouth: No oral lesions.      Pharynx: No oropharyngeal exudate or posterior oropharyngeal erythema.   Eyes:      General: Lids are normal. No scleral icterus.     Extraocular Movements: Extraocular movements intact.      Conjunctiva/sclera: Conjunctivae normal.      Pupils: Pupils are equal, round, and reactive to light.   Neck:      Thyroid: No thyroid mass or thyromegaly.      Vascular: No carotid bruit.   Cardiovascular:      Rate and Rhythm: Normal rate and regular rhythm. No extrasystoles are present.     Chest Wall: PMI is not displaced.      Heart sounds: Normal heart sounds. No murmur heard.     No gallop.   Pulmonary:      Effort: Pulmonary effort is normal. No accessory muscle usage or respiratory distress.      Breath sounds: Normal breath sounds.   Abdominal:      General: Bowel sounds are normal. There is no abdominal bruit.      Palpations: Abdomen is soft.      Tenderness: There is no abdominal tenderness. There is no rebound.   Musculoskeletal:      Cervical back: Normal range of motion and neck supple.   Lymphadenopathy:      Head:      Right side of head: No submental or submandibular adenopathy.      Left side of head: No submental or submandibular adenopathy.      Cervical:      Right cervical: No superficial, deep or posterior cervical adenopathy.     Left cervical: No superficial, deep or posterior cervical adenopathy.      Upper Body:      Right upper body: No supraclavicular adenopathy.      Left upper body: No supraclavicular adenopathy.   Skin:     General: Skin is warm and dry.  "  Neurological:      Mental Status: She is alert and oriented to person, place, and time.       Blood pressure 112/70, pulse 73, temperature 98.2 °F (36.8 °C), resp. rate 16, height 5' 3" (1.6 m), weight 71.6 kg (157 lb 15.4 oz), SpO2 98 %.Body mass index is 27.98 kg/m².            A/P:  1) constipation.  New.  We did discuss fiber supplements and MiraLax.  If this continues, she will visit with GI and consider a repeat colonoscopy   2)  Hypertension.  Over controlled.  We are going to stop her Diovan.  She will continue with the Toprol.  If her home readings are consistently outside of the range of 110-135/75-85, she will let me know  3)  history of H pylori infection.  Status unknown.  We will do stool antigen test to document resolution.  4)  Health maintenance issues.  These are all up-to-date.  She will have her labs in September as planned    "

## 2023-08-15 ENCOUNTER — LAB VISIT (OUTPATIENT)
Dept: LAB | Facility: HOSPITAL | Age: 68
End: 2023-08-15
Attending: FAMILY MEDICINE

## 2023-08-15 DIAGNOSIS — A04.8 H. PYLORI INFECTION: ICD-10-CM

## 2023-08-15 PROCEDURE — 87338 HPYLORI STOOL AG IA: CPT | Performed by: FAMILY MEDICINE

## 2023-08-17 RX ORDER — VALSARTAN 40 MG/1
TABLET ORAL
Qty: 30 TABLET | Refills: 1 | OUTPATIENT
Start: 2023-08-17

## 2023-08-17 NOTE — TELEPHONE ENCOUNTER
No care due was identified.  [facility] Embedded Care Due Messages. Reference number: [id number].   8/17/2023 10:31:30 AM CDT

## 2023-08-17 NOTE — TELEPHONE ENCOUNTER
Refill Decision Note   Paulina Agustin  is requesting a refill authorization.  Brief Assessment and Rationale for Refill:  Quick Discontinue     Medication Therapy Plan:  The original prescription was discontinued on 8/14/2023 by Sury Stewart MD. Renewing this prescription may not be appropriate      Comments:     Note composed:11:48 AM 08/17/2023

## 2023-08-22 LAB
H PYLORI AG STL QL IA: NOT DETECTED
SPECIMEN SOURCE: NORMAL

## 2023-08-25 ENCOUNTER — PATIENT MESSAGE (OUTPATIENT)
Dept: FAMILY MEDICINE | Facility: CLINIC | Age: 68
End: 2023-08-25
Payer: MEDICARE

## 2023-09-02 ENCOUNTER — CLINICAL SUPPORT (OUTPATIENT)
Dept: CARDIOLOGY | Facility: HOSPITAL | Age: 68
End: 2023-09-02
Payer: MEDICARE

## 2023-09-02 DIAGNOSIS — Z95.818 PRESENCE OF OTHER CARDIAC IMPLANTS AND GRAFTS: ICD-10-CM

## 2023-09-02 PROCEDURE — 93298 REM INTERROG DEV EVAL SCRMS: CPT | Mod: ,,, | Performed by: INTERNAL MEDICINE

## 2023-09-02 PROCEDURE — 93298 CARDIAC DEVICE CHECK - REMOTE: ICD-10-PCS | Mod: ,,, | Performed by: INTERNAL MEDICINE

## 2023-09-16 NOTE — TELEPHONE ENCOUNTER
No care due was identified.  [facility] Embedded Care Due Messages. Reference number: [id number].   9/16/2023 11:06:48 AM CDT

## 2023-09-17 RX ORDER — METOPROLOL SUCCINATE 50 MG/1
TABLET, EXTENDED RELEASE ORAL
Qty: 90 TABLET | Refills: 3 | Status: SHIPPED | OUTPATIENT
Start: 2023-09-17

## 2023-09-18 NOTE — TELEPHONE ENCOUNTER
Refill Decision Note   Paulina Agustin  is requesting a refill authorization.  Brief Assessment and Rationale for Refill:  Approve     Medication Therapy Plan:         Comments:     Note composed:11:00 PM 09/17/2023

## 2023-10-02 ENCOUNTER — CLINICAL SUPPORT (OUTPATIENT)
Dept: CARDIOLOGY | Facility: HOSPITAL | Age: 68
End: 2023-10-02
Payer: MEDICARE

## 2023-10-02 DIAGNOSIS — Z95.818 PRESENCE OF OTHER CARDIAC IMPLANTS AND GRAFTS: ICD-10-CM

## 2023-11-02 ENCOUNTER — CLINICAL SUPPORT (OUTPATIENT)
Dept: CARDIOLOGY | Facility: HOSPITAL | Age: 68
End: 2023-11-02
Payer: MEDICARE

## 2023-11-02 ENCOUNTER — CLINICAL SUPPORT (OUTPATIENT)
Dept: CARDIOLOGY | Facility: HOSPITAL | Age: 68
End: 2023-11-02
Attending: INTERNAL MEDICINE
Payer: MEDICARE

## 2023-11-02 DIAGNOSIS — I49.8 OTHER SPECIFIED CARDIAC ARRHYTHMIAS: ICD-10-CM

## 2023-11-02 PROCEDURE — 93298 CARDIAC DEVICE CHECK - REMOTE: ICD-10-PCS | Mod: ,,, | Performed by: INTERNAL MEDICINE

## 2023-11-02 PROCEDURE — 93298 REM INTERROG DEV EVAL SCRMS: CPT | Mod: ,,, | Performed by: INTERNAL MEDICINE

## 2023-11-13 LAB
OHS CV AF BURDEN PERCENT: < 1
OHS CV DC REMOTE DEVICE TYPE: NORMAL

## 2023-12-03 ENCOUNTER — CLINICAL SUPPORT (OUTPATIENT)
Dept: CARDIOLOGY | Facility: HOSPITAL | Age: 68
End: 2023-12-03
Payer: MEDICARE

## 2023-12-03 ENCOUNTER — CLINICAL SUPPORT (OUTPATIENT)
Dept: CARDIOLOGY | Facility: HOSPITAL | Age: 68
End: 2023-12-03
Attending: INTERNAL MEDICINE
Payer: MEDICARE

## 2023-12-03 DIAGNOSIS — I49.8 OTHER SPECIFIED CARDIAC ARRHYTHMIAS: ICD-10-CM

## 2023-12-03 PROCEDURE — 93298 CARDIAC DEVICE CHECK - REMOTE: ICD-10-PCS | Mod: ,,, | Performed by: INTERNAL MEDICINE

## 2023-12-03 PROCEDURE — 93298 REM INTERROG DEV EVAL SCRMS: CPT | Mod: ,,, | Performed by: INTERNAL MEDICINE

## 2023-12-05 LAB
OHS CV AF BURDEN PERCENT: < 1
OHS CV DC REMOTE DEVICE TYPE: NORMAL

## 2024-01-03 ENCOUNTER — CLINICAL SUPPORT (OUTPATIENT)
Dept: CARDIOLOGY | Facility: HOSPITAL | Age: 69
End: 2024-01-03
Attending: INTERNAL MEDICINE
Payer: MEDICARE

## 2024-01-03 ENCOUNTER — CLINICAL SUPPORT (OUTPATIENT)
Dept: CARDIOLOGY | Facility: HOSPITAL | Age: 69
End: 2024-01-03
Payer: MEDICARE

## 2024-01-03 DIAGNOSIS — I49.8 OTHER SPECIFIED CARDIAC ARRHYTHMIAS: ICD-10-CM

## 2024-01-03 LAB
OHS CV AF BURDEN PERCENT: < 1
OHS CV DC REMOTE DEVICE TYPE: NORMAL

## 2024-01-03 PROCEDURE — 93298 REM INTERROG DEV EVAL SCRMS: CPT | Mod: ,,, | Performed by: INTERNAL MEDICINE

## 2024-01-10 DIAGNOSIS — I10 PRIMARY HYPERTENSION: ICD-10-CM

## 2024-01-11 DIAGNOSIS — Z00.00 ENCOUNTER FOR MEDICARE ANNUAL WELLNESS EXAM: ICD-10-CM

## 2024-02-03 ENCOUNTER — HOSPITAL ENCOUNTER (OUTPATIENT)
Dept: CARDIOLOGY | Facility: HOSPITAL | Age: 69
Discharge: HOME | End: 2024-02-03
Attending: INTERNAL MEDICINE
Payer: MEDICARE

## 2024-02-03 ENCOUNTER — CLINICAL SUPPORT (OUTPATIENT)
Dept: CARDIOLOGY | Facility: HOSPITAL | Age: 69
End: 2024-02-03
Payer: MEDICARE

## 2024-02-03 DIAGNOSIS — I49.8 OTHER SPECIFIED CARDIAC ARRHYTHMIAS: ICD-10-CM

## 2024-02-03 PROCEDURE — 93298 REM INTERROG DEV EVAL SCRMS: CPT | Mod: 26,,, | Performed by: INTERNAL MEDICINE

## 2024-02-05 LAB
OHS CV AF BURDEN PERCENT: < 1
OHS CV DC REMOTE DEVICE TYPE: NORMAL

## 2024-02-25 NOTE — PROGRESS NOTES
"Subjective:    Patient ID:  Paulina Agustin is a 68 y.o. female who presents for follow-up of Hypertension      Problem List Items Addressed This Visit          Cardiac/Vascular    Primary hypertension - Primary     Other Visit Diagnoses       Palpitations                HPI    Patient was last seen on 02/28/2023 at which time valsartan was reduced to help stabilize blood pressure    On assessment today, the patient states that she feels OK today. 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 Not able to exercise.    No angina    Home BP - Not recently checking as she is working so much       Objective:     Vitals:    02/29/24 1024   BP: (!) 149/78   BP Location: Left arm   Patient Position: Sitting   BP Method: Medium (Automatic)   Pulse: 68   Weight: 73.8 kg (162 lb 11.2 oz)   Height: 5' 3.6" (1.615 m)       BP Readings from Last 5 Encounters:   02/29/24 (!) 149/78   08/14/23 112/70   02/28/23 118/75   02/09/23 128/68   10/24/22 (!) 157/88        Physical Exam  Vitals and nursing note reviewed.   Constitutional:       General: She is not in acute distress.     Appearance: She is well-developed.   HENT:      Head: Normocephalic and atraumatic.   Neck:      Vascular: No JVD.   Cardiovascular:      Rate and Rhythm: Normal rate and regular rhythm.      Heart sounds: Normal heart sounds. No murmur heard.     No friction rub. No gallop.   Pulmonary:      Effort: Pulmonary effort is normal. No respiratory distress.      Breath sounds: Normal breath sounds. No wheezing or rales.   Abdominal:      General: Bowel sounds are normal.      Palpations: Abdomen is soft.      Tenderness: There is no abdominal tenderness. There is no guarding or rebound.   Musculoskeletal:         General: No tenderness.      Cervical back: Neck supple.   Skin:     General: Skin is warm and dry.   Neurological:      Mental Status: She is alert and oriented to person, place, and time. "   Psychiatric:         Behavior: Behavior normal.             Current Outpatient Medications   Medication Instructions    azelastine (ASTELIN) 274 mcg, Nasal, 2 times daily    cholecalciferol (vitamin D3) 5,000 Units, Oral, Daily    estradioL (VIVELLE-DOT) 0.025 mg/24 hr APPLY 1 PATCH TO ABDOMEN TWICE WEEKLY    MAGNESIUM CITRATE ORAL Oral    metoprolol succinate (TOPROL-XL) 50 MG 24 hr tablet TAKE 1 TABLET BY MOUTH EVERY DAY    progesterone (PROMETRIUM) 100 mg, Oral, Nightly       Lipid Panel:   Lab Results   Component Value Date    CHOL 195 09/13/2022    HDL 86 (A) 09/13/2022    LDLCALC 114 09/13/2022    TRIG 84 09/13/2022       The 10-year ASCVD risk score (Chrissie DK, et al., 2019) is: 13.5%    Values used to calculate the score:      Age: 68 years      Sex: Female      Is Non- : No      Diabetic: No      Tobacco smoker: No      Systolic Blood Pressure: 149 mmHg      Is BP treated: Yes      HDL Cholesterol: 86 mg/dL      Total Cholesterol: 195 mg/dL    Most Recent EKG Results  Results for orders placed or performed in visit on 09/26/22   IN OFFICE EKG 12-LEAD (to Forbes)    Collection Time: 09/26/22  8:27 AM    Narrative    Test Reason : I10,    Vent. Rate : 064 BPM     Atrial Rate : 064 BPM     P-R Int : 168 ms          QRS Dur : 078 ms      QT Int : 402 ms       P-R-T Axes : 067 025 051 degrees     QTc Int : 414 ms    Normal sinus rhythm  Normal ECG  When compared with ECG of 09-MAR-2021 11:14,  No significant change was found  Confirmed by Mc STARKEY, Mor LONG ([id number]) on 9/26/2022 3:09:45 PM    Referred By: MD MILAGROS AWAN           Confirmed By:Mor Bentley MD       Most Recent Echocardiogram Results  Results for orders placed in visit on 04/19/21    Echo Color Flow Doppler? Yes    Interpretation Summary  · The left ventricle is normal in size with concentric remodeling and normal systolic function.  · The estimated ejection fraction is 60-65%.  · Normal left ventricular diastolic  function.  · Normal right ventricular size with normal right ventricular systolic function.  · Normal central venous pressure (3 mmHg).  · The estimated PA systolic pressure is 26 mmHg.      Most Recent Nuclear Stress Test Results  No results found for this or any previous visit.      Most Recent Cardiac PET Stress Test Results  No results found for this or any previous visit.      Most Recent Cardiovascular Angiogram results  No results found for this or any previous visit.      Other Most Recent Cardiology Results  Results for orders placed in visit on 02/03/24    Cardiac device check - Remote        All pertinent data including labs, imaging, EKGs, and studies listed above were reviewed.  Patient's most recent EKG tracing was personally interpreted by this provider.    Assessment:       1. Primary hypertension    2. Palpitations         Plan for treatment of the above diagnoses:     Symptoms OK today  BP elevated today, past BPs OK  Most recent echocardiogram reviewed personally     Continue metoprolol succinate 50 mg PO Daily   Emphasized hydration   Minimize caffeine   Echocardiogram prior to next visit   Continue monitoring device  Restart exercise    Continue other cardiac medications  Mediterranean Diet/Cardiovascular Exercise Program    Patient queried and all questions were answered.    F/u in 1 year with echo prior      Signed:    Steve Figueroa MD  2/29/2024 4:44 PM

## 2024-02-29 ENCOUNTER — OFFICE VISIT (OUTPATIENT)
Dept: CARDIOLOGY | Facility: CLINIC | Age: 69
End: 2024-02-29
Payer: MEDICARE

## 2024-02-29 VITALS
WEIGHT: 162.69 LBS | BODY MASS INDEX: 27.77 KG/M2 | SYSTOLIC BLOOD PRESSURE: 126 MMHG | HEIGHT: 64 IN | HEART RATE: 68 BPM | DIASTOLIC BLOOD PRESSURE: 72 MMHG

## 2024-02-29 DIAGNOSIS — I10 PRIMARY HYPERTENSION: Primary | ICD-10-CM

## 2024-02-29 DIAGNOSIS — R00.2 PALPITATIONS: ICD-10-CM

## 2024-02-29 PROCEDURE — 99214 OFFICE O/P EST MOD 30 MIN: CPT | Mod: S$PBB,,, | Performed by: INTERNAL MEDICINE

## 2024-02-29 PROCEDURE — 99999 PR PBB SHADOW E&M-EST. PATIENT-LVL III: CPT | Mod: PBBFAC,,, | Performed by: INTERNAL MEDICINE

## 2024-02-29 PROCEDURE — 99213 OFFICE O/P EST LOW 20 MIN: CPT | Mod: PBBFAC,PO | Performed by: INTERNAL MEDICINE

## 2024-03-05 ENCOUNTER — HOSPITAL ENCOUNTER (OUTPATIENT)
Dept: CARDIOLOGY | Facility: HOSPITAL | Age: 69
Discharge: HOME | End: 2024-03-05
Attending: INTERNAL MEDICINE
Payer: MEDICARE

## 2024-03-05 ENCOUNTER — CLINICAL SUPPORT (OUTPATIENT)
Dept: CARDIOLOGY | Facility: HOSPITAL | Age: 69
End: 2024-03-05
Payer: MEDICARE

## 2024-03-05 DIAGNOSIS — I49.8 OTHER SPECIFIED CARDIAC ARRHYTHMIAS: ICD-10-CM

## 2024-03-05 PROCEDURE — 93298 REM INTERROG DEV EVAL SCRMS: CPT | Mod: ,,, | Performed by: INTERNAL MEDICINE

## 2024-03-07 LAB
OHS CV AF BURDEN PERCENT: < 1
OHS CV DC REMOTE DEVICE TYPE: NORMAL

## 2024-04-05 ENCOUNTER — CLINICAL SUPPORT (OUTPATIENT)
Dept: CARDIOLOGY | Facility: HOSPITAL | Age: 69
End: 2024-04-05
Payer: MEDICARE

## 2024-04-05 ENCOUNTER — HOSPITAL ENCOUNTER (OUTPATIENT)
Dept: CARDIOLOGY | Facility: HOSPITAL | Age: 69
Discharge: HOME | End: 2024-04-05
Attending: INTERNAL MEDICINE
Payer: MEDICARE

## 2024-04-05 DIAGNOSIS — I49.8 OTHER SPECIFIED CARDIAC ARRHYTHMIAS: ICD-10-CM

## 2024-04-05 PROCEDURE — 93298 REM INTERROG DEV EVAL SCRMS: CPT | Mod: 26,,, | Performed by: INTERNAL MEDICINE

## 2024-04-08 LAB
OHS CV AF BURDEN PERCENT: < 1
OHS CV DC REMOTE DEVICE TYPE: NORMAL

## 2024-05-06 ENCOUNTER — CLINICAL SUPPORT (OUTPATIENT)
Dept: CARDIOLOGY | Facility: HOSPITAL | Age: 69
End: 2024-05-06
Payer: MEDICARE

## 2024-05-06 ENCOUNTER — HOSPITAL ENCOUNTER (OUTPATIENT)
Dept: CARDIOLOGY | Facility: HOSPITAL | Age: 69
Discharge: HOME | End: 2024-05-06
Attending: INTERNAL MEDICINE
Payer: MEDICARE

## 2024-05-06 DIAGNOSIS — I49.8 OTHER SPECIFIED CARDIAC ARRHYTHMIAS: ICD-10-CM

## 2024-05-06 LAB
OHS CV AF BURDEN PERCENT: < 1
OHS CV DC REMOTE DEVICE TYPE: NORMAL

## 2024-05-06 PROCEDURE — 93298 REM INTERROG DEV EVAL SCRMS: CPT | Mod: 26,,, | Performed by: INTERNAL MEDICINE

## 2024-06-06 ENCOUNTER — HOSPITAL ENCOUNTER (OUTPATIENT)
Dept: CARDIOLOGY | Facility: HOSPITAL | Age: 69
Discharge: HOME | End: 2024-06-06
Attending: INTERNAL MEDICINE

## 2024-06-06 ENCOUNTER — CLINICAL SUPPORT (OUTPATIENT)
Dept: CARDIOLOGY | Facility: HOSPITAL | Age: 69
End: 2024-06-06
Payer: MEDICARE

## 2024-06-06 DIAGNOSIS — I49.8 OTHER SPECIFIED CARDIAC ARRHYTHMIAS: ICD-10-CM

## 2024-06-06 PROCEDURE — 93298 REM INTERROG DEV EVAL SCRMS: CPT | Mod: 26,,, | Performed by: INTERNAL MEDICINE

## 2024-06-10 LAB
OHS CV AF BURDEN PERCENT: < 1
OHS CV DC REMOTE DEVICE TYPE: NORMAL

## 2024-07-07 ENCOUNTER — CLINICAL SUPPORT (OUTPATIENT)
Dept: CARDIOLOGY | Facility: HOSPITAL | Age: 69
End: 2024-07-07
Payer: MEDICARE

## 2024-07-07 ENCOUNTER — HOSPITAL ENCOUNTER (OUTPATIENT)
Dept: CARDIOLOGY | Facility: HOSPITAL | Age: 69
Discharge: HOME | End: 2024-07-07
Attending: INTERNAL MEDICINE

## 2024-07-07 DIAGNOSIS — I49.8 OTHER SPECIFIED CARDIAC ARRHYTHMIAS: ICD-10-CM

## 2024-07-07 PROCEDURE — 93298 REM INTERROG DEV EVAL SCRMS: CPT | Mod: 26,,, | Performed by: INTERNAL MEDICINE

## 2024-07-09 LAB
OHS CV AF BURDEN PERCENT: < 1
OHS CV DC REMOTE DEVICE TYPE: NORMAL

## 2024-07-29 LAB
ALBUMIN: 4
ALP ISOS SERPL LEV INH-CCNC: 66 U/L
ALT: 17
AST: 16
BILIRUBIN, TOTAL: 0.4
BUN/CREAT SERPL: 2
CALCIUM SERPL-MCNC: 9.6 MG/DL
CARBON DIOXIDE, CO2: 22 MMOL/L
CHLORIDE SERPL-SCNC: 107 MMOL/L (ref 96–106)
CHOLESTEROL (LIPID PANEL): 169
CREATININE: 0.71
EGFR: 91
GLOBULIN: 2.1
GLUCOSE: 93
HDLC SERPL-MCNC: 51 MG/DL
LDLC SERPL CALC-MCNC: 95 MG/DL
POTASSIUM SERPL-SCNC: 4.7 MMOL/L (ref 3.4–5.3)
PROT SERPL-MCNC: 6.1 G/DL
SODIUM BLD-SCNC: 142 MMOL/L (ref 134–144)
TRIGLYCERIDE (LIPID PAN): 127
UREA NITROGEN (BUN): 16 MG/DL
VLDLC SERPL CALC-MCNC: 23 MG/DL

## 2024-08-07 ENCOUNTER — CLINICAL SUPPORT (OUTPATIENT)
Dept: CARDIOLOGY | Facility: HOSPITAL | Age: 69
End: 2024-08-07
Payer: MEDICARE

## 2024-08-07 ENCOUNTER — HOSPITAL ENCOUNTER (OUTPATIENT)
Dept: CARDIOLOGY | Facility: HOSPITAL | Age: 69
Discharge: HOME | End: 2024-08-07
Attending: INTERNAL MEDICINE

## 2024-08-07 DIAGNOSIS — I49.8 OTHER SPECIFIED CARDIAC ARRHYTHMIAS: ICD-10-CM

## 2024-08-07 LAB
OHS CV AF BURDEN PERCENT: < 1
OHS CV DC REMOTE DEVICE TYPE: NORMAL

## 2024-08-07 PROCEDURE — 93298 REM INTERROG DEV EVAL SCRMS: CPT | Mod: 26,,, | Performed by: INTERNAL MEDICINE

## 2024-09-07 ENCOUNTER — HOSPITAL ENCOUNTER (OUTPATIENT)
Dept: CARDIOLOGY | Facility: HOSPITAL | Age: 69
Discharge: HOME | End: 2024-09-07
Attending: INTERNAL MEDICINE
Payer: MEDICARE

## 2024-09-07 ENCOUNTER — CLINICAL SUPPORT (OUTPATIENT)
Dept: CARDIOLOGY | Facility: HOSPITAL | Age: 69
End: 2024-09-07
Payer: MEDICARE

## 2024-09-07 DIAGNOSIS — I49.8 OTHER SPECIFIED CARDIAC ARRHYTHMIAS: ICD-10-CM

## 2024-09-07 PROCEDURE — 93298 REM INTERROG DEV EVAL SCRMS: CPT | Mod: 26,,, | Performed by: INTERNAL MEDICINE

## 2024-09-09 LAB
OHS CV AF BURDEN PERCENT: < 1
OHS CV DC REMOTE DEVICE TYPE: NORMAL

## 2024-09-19 RX ORDER — METOPROLOL SUCCINATE 50 MG/1
TABLET, EXTENDED RELEASE ORAL
Qty: 90 TABLET | Refills: 0 | Status: SHIPPED | OUTPATIENT
Start: 2024-09-19

## 2024-09-19 NOTE — TELEPHONE ENCOUNTER
No care due was identified.  [facility] Embedded Care Due Messages. Reference number: [id number].   9/19/2024 12:25:17 AM CDT

## 2024-09-19 NOTE — TELEPHONE ENCOUNTER
Refill Decision Note   Paulina Agustin  is requesting a refill authorization.  Brief Assessment and Rationale for Refill:  Approve     Medication Therapy Plan:         Comments:     Note composed:1:53 PM 09/19/2024

## 2024-09-23 ENCOUNTER — HOSPITAL ENCOUNTER (OUTPATIENT)
Dept: RADIOLOGY | Facility: HOSPITAL | Age: 69
Discharge: HOME | End: 2024-09-23
Attending: OBSTETRICS & GYNECOLOGY
Payer: MEDICARE

## 2024-09-23 DIAGNOSIS — Z12.31 ENCOUNTER FOR SCREENING MAMMOGRAM FOR MALIGNANT NEOPLASM OF BREAST: ICD-10-CM

## 2024-09-23 PROCEDURE — 77067 SCR MAMMO BI INCL CAD: CPT | Mod: 26,,, | Performed by: RADIOLOGY

## 2024-09-23 PROCEDURE — 77067 SCR MAMMO BI INCL CAD: CPT | Mod: TC,PO

## 2024-09-23 PROCEDURE — 77063 BREAST TOMOSYNTHESIS BI: CPT | Mod: 26,,, | Performed by: RADIOLOGY

## 2024-10-08 ENCOUNTER — HOSPITAL ENCOUNTER (OUTPATIENT)
Dept: CARDIOLOGY | Facility: HOSPITAL | Age: 69
Discharge: HOME | End: 2024-10-08
Attending: INTERNAL MEDICINE
Payer: MEDICARE

## 2024-10-08 ENCOUNTER — CLINICAL SUPPORT (OUTPATIENT)
Dept: CARDIOLOGY | Facility: HOSPITAL | Age: 69
End: 2024-10-08
Payer: MEDICARE

## 2024-10-08 DIAGNOSIS — I49.8 OTHER SPECIFIED CARDIAC ARRHYTHMIAS: ICD-10-CM

## 2024-10-08 LAB
OHS CV AF BURDEN PERCENT: < 1
OHS CV DC REMOTE DEVICE TYPE: NORMAL

## 2024-10-08 PROCEDURE — 93298 REM INTERROG DEV EVAL SCRMS: CPT | Mod: 26,,, | Performed by: INTERNAL MEDICINE

## 2024-11-08 ENCOUNTER — CLINICAL SUPPORT (OUTPATIENT)
Dept: CARDIOLOGY | Facility: HOSPITAL | Age: 69
End: 2024-11-08
Payer: MEDICARE

## 2024-11-08 ENCOUNTER — HOSPITAL ENCOUNTER (OUTPATIENT)
Dept: CARDIOLOGY | Facility: HOSPITAL | Age: 69
Discharge: HOME | End: 2024-11-08
Attending: INTERNAL MEDICINE
Payer: MEDICARE

## 2024-11-08 DIAGNOSIS — I49.8 OTHER SPECIFIED CARDIAC ARRHYTHMIAS: ICD-10-CM

## 2024-11-08 PROCEDURE — 93298 REM INTERROG DEV EVAL SCRMS: CPT | Mod: 26,,, | Performed by: INTERNAL MEDICINE

## 2024-11-14 LAB
OHS CV AF BURDEN PERCENT: < 1
OHS CV DC REMOTE DEVICE TYPE: NORMAL

## 2024-11-25 ENCOUNTER — OFFICE VISIT (OUTPATIENT)
Dept: FAMILY MEDICINE | Facility: CLINIC | Age: 69
End: 2024-11-25
Payer: MEDICARE

## 2024-11-25 VITALS
TEMPERATURE: 98 F | HEIGHT: 64 IN | RESPIRATION RATE: 18 BRPM | HEART RATE: 70 BPM | BODY MASS INDEX: 27.53 KG/M2 | OXYGEN SATURATION: 96 % | DIASTOLIC BLOOD PRESSURE: 96 MMHG | WEIGHT: 161.25 LBS | SYSTOLIC BLOOD PRESSURE: 152 MMHG

## 2024-11-25 DIAGNOSIS — K86.2 PANCREAS CYST: ICD-10-CM

## 2024-11-25 DIAGNOSIS — I10 HYPERTENSION, ESSENTIAL: Primary | ICD-10-CM

## 2024-11-25 DIAGNOSIS — G47.30 SLEEP APNEA, UNSPECIFIED TYPE: ICD-10-CM

## 2024-11-25 DIAGNOSIS — Z23 IMMUNIZATION DUE: ICD-10-CM

## 2024-11-25 PROCEDURE — 99214 OFFICE O/P EST MOD 30 MIN: CPT | Mod: S$GLB,,, | Performed by: FAMILY MEDICINE

## 2024-11-25 PROCEDURE — G2211 COMPLEX E/M VISIT ADD ON: HCPCS | Mod: S$GLB,,, | Performed by: FAMILY MEDICINE

## 2024-11-25 PROCEDURE — G0008 ADMIN INFLUENZA VIRUS VAC: HCPCS | Mod: S$GLB,,, | Performed by: FAMILY MEDICINE

## 2024-11-25 PROCEDURE — 90653 IIV ADJUVANT VACCINE IM: CPT | Mod: S$GLB,,, | Performed by: FAMILY MEDICINE

## 2024-11-25 RX ORDER — VALSARTAN 80 MG/1
80 TABLET ORAL DAILY
Qty: 30 TABLET | Refills: 1 | Status: SHIPPED | OUTPATIENT
Start: 2024-11-25 | End: 2025-11-25

## 2024-11-25 NOTE — PROGRESS NOTES
Subjective:       Patient ID: Paulina Agustin is a 69 y.o. female.    Chief Complaint: Annual Exam    HPI  The patient is a 69-year-old who is here today for her annual exam.  She has had a busy year.  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  Also, her dad  in  and her best friend  from cancer a week ago and these deaths have been hard on her.    Today we discussed the followin) sleep apnea.  She would like to have a sleep study.  She believes she has sleep apnea.  Her friend who is traveling with her reported that she would stop breathing and gasps for air at night when she slept and that she snored.    2)  Hypertension.  Today her blood pressure is 152/96.  She is taking Toprol 50 mg once a day.      She does continue to follow with Dr. Crowley for her HRT and had labs there this summer.  Her mammogram is up-to-date.  She is going to see Dr. Lea to get her colonoscopy done.  She has not been consistently exercising but plans to get back to that after the new year    Review of Systems   Constitutional:  Negative for appetite change, chills, diaphoresis, fatigue, fever and unexpected weight change.   HENT:  Negative for congestion, dental problem, ear pain, hearing loss, postnasal drip, rhinorrhea, sneezing, sore throat and trouble swallowing.    Eyes:  Negative for photophobia, pain, discharge and visual disturbance.   Respiratory:  Negative for cough, chest tightness, shortness of breath and wheezing.    Cardiovascular:  Negative for chest pain, palpitations and leg swelling.   Gastrointestinal:  Negative for abdominal distention, abdominal pain, blood in stool, constipation, diarrhea, nausea and vomiting.   Endocrine: Negative for cold intolerance, heat intolerance, polydipsia and polyuria.   Genitourinary:  Negative for dysuria, flank pain, frequency, genital sores, hematuria, menstrual problem and vaginal discharge.   Musculoskeletal:  Negative for arthralgias, joint swelling  and myalgias.   Skin:  Negative for rash.   Neurological:  Negative for dizziness, syncope, light-headedness and headaches.   Hematological:  Negative for adenopathy. Does not bruise/bleed easily.   Psychiatric/Behavioral:  Negative for dysphoric mood, self-injury, sleep disturbance and suicidal ideas. The patient is not nervous/anxious.          Objective:      Physical Exam  Constitutional:       General: She is not in acute distress.     Appearance: Normal appearance. She is well-developed.   HENT:      Head: Normocephalic and atraumatic.      Right Ear: Hearing, tympanic membrane, ear canal and external ear normal.      Left Ear: Hearing, tympanic membrane, ear canal and external ear normal.      Nose: Nose normal.      Mouth/Throat:      Mouth: No oral lesions.      Pharynx: No oropharyngeal exudate or posterior oropharyngeal erythema.   Eyes:      General: Lids are normal. No scleral icterus.     Extraocular Movements: Extraocular movements intact.      Conjunctiva/sclera: Conjunctivae normal.      Pupils: Pupils are equal, round, and reactive to light.   Neck:      Thyroid: No thyroid mass or thyromegaly.      Vascular: No carotid bruit.   Cardiovascular:      Rate and Rhythm: Normal rate and regular rhythm. No extrasystoles are present.     Chest Wall: PMI is not displaced.      Heart sounds: Normal heart sounds. No murmur heard.     No gallop.   Pulmonary:      Effort: Pulmonary effort is normal. No accessory muscle usage or respiratory distress.      Breath sounds: Normal breath sounds.   Abdominal:      General: Bowel sounds are normal. There is no abdominal bruit.      Palpations: Abdomen is soft.      Tenderness: There is no abdominal tenderness. There is no rebound.   Musculoskeletal:      Cervical back: Normal range of motion and neck supple.   Lymphadenopathy:      Head:      Right side of head: No submental or submandibular adenopathy.      Left side of head: No submental or submandibular  "adenopathy.      Cervical:      Right cervical: No superficial, deep or posterior cervical adenopathy.     Left cervical: No superficial, deep or posterior cervical adenopathy.      Upper Body:      Right upper body: No supraclavicular adenopathy.      Left upper body: No supraclavicular adenopathy.   Skin:     General: Skin is warm and dry.   Neurological:      Mental Status: She is alert and oriented to person, place, and time.      Cranial Nerves: No cranial nerve deficit.      Sensory: No sensory deficit.   Psychiatric:         Speech: Speech normal.         Behavior: Behavior normal.         Thought Content: Thought content normal.       Blood pressure (!) 152/96, pulse 70, temperature 98.4 °F (36.9 °C), resp. rate 18, height 5' 3.6" (1.615 m), weight 73.1 kg (161 lb 4.3 oz), SpO2 96%.Body mass index is 28.03 kg/m².              A/P:  1) annual exam.  Health maintenance issues and anticipatory guidance issues were discussed.  She will get back to her regular physical activity and a healthy diet.  We will request labs from her gyn.  She will schedule an appointment for her colonoscopy  2) sleep apnea.  New.  We will order a sleep evaluation .    3)  Hypertension.  Uncontrolled.  Continue with Toprol.  We are going to resume Diovan 80 mg once a day.  We will see her back in 2 weeks for blood pressure check with the nurses and further adjustments if needed  4) pancreatic cysts.  We will schedule her MRI for February as these are being monitored every 2 years.      She does continue to follow with Dr. Crowley for her HRT and had labs there this summer.  Her mammogram is up-to-date.  She is going to see Dr. Lea to get her colonoscopy done.  She has not been consistently exercising but plans to get back to that after the new year    "

## 2024-11-25 NOTE — PATIENT INSTRUCTIONS
Please contact the gastroenterologist to schedule an appointment.        [facility]  [address]  [address] [facility], [place], [place]. [id number] [phone]

## 2024-12-02 ENCOUNTER — PATIENT MESSAGE (OUTPATIENT)
Dept: FAMILY MEDICINE | Facility: CLINIC | Age: 69
End: 2024-12-02
Payer: MEDICARE

## 2024-12-09 ENCOUNTER — HOSPITAL ENCOUNTER (OUTPATIENT)
Dept: CARDIOLOGY | Facility: HOSPITAL | Age: 69
Discharge: HOME | End: 2024-12-09
Attending: INTERNAL MEDICINE
Payer: MEDICARE

## 2024-12-09 ENCOUNTER — CLINICAL SUPPORT (OUTPATIENT)
Dept: CARDIOLOGY | Facility: HOSPITAL | Age: 69
End: 2024-12-09
Payer: MEDICARE

## 2024-12-09 DIAGNOSIS — I49.8 OTHER SPECIFIED CARDIAC ARRHYTHMIAS: ICD-10-CM

## 2024-12-09 PROCEDURE — 93298 REM INTERROG DEV EVAL SCRMS: CPT | Mod: 26,,, | Performed by: INTERNAL MEDICINE

## 2024-12-17 ENCOUNTER — TELEPHONE (OUTPATIENT)
Dept: FAMILY MEDICINE | Facility: CLINIC | Age: 69
End: 2024-12-17

## 2024-12-17 ENCOUNTER — CLINICAL SUPPORT (OUTPATIENT)
Dept: FAMILY MEDICINE | Facility: CLINIC | Age: 69
End: 2024-12-17
Payer: MEDICARE

## 2024-12-17 VITALS
OXYGEN SATURATION: 97 % | DIASTOLIC BLOOD PRESSURE: 74 MMHG | SYSTOLIC BLOOD PRESSURE: 132 MMHG | RESPIRATION RATE: 18 BRPM | HEART RATE: 80 BPM

## 2024-12-17 DIAGNOSIS — Z01.30 BLOOD PRESSURE CHECK: Primary | ICD-10-CM

## 2024-12-17 NOTE — PROGRESS NOTES
/74  , Pulse: 80      Dr. Stewart notified.    Paulina Agustin 69 y.o. female is here today for Blood Pressure check.   History of HTN yes.    Review of patient's allergies indicates:  No Known Allergies  Creatinine   Date Value Ref Range Status   12/28/2022 0.9 0.5 - 1.4 mg/dL Final   ]  Patient verifies taking blood pressure medications on a regular basis at the same time of the day.     Current Outpatient Medications:     azelastine (ASTELIN) 137 mcg (0.1 %) nasal spray, 2 sprays (274 mcg total) by Nasal route 2 (two) times daily., Disp: 90 mL, Rfl: 2    cholecalciferol, vitamin D3, 125 mcg (5,000 unit) Tab, Take 5,000 Units by mouth once daily., Disp: , Rfl:     estradioL (VIVELLE-DOT) 0.025 mg/24 hr, APPLY 1 PATCH TO ABDOMEN TWICE WEEKLY, Disp: , Rfl:     metoprolol succinate (TOPROL-XL) 50 MG 24 hr tablet, TAKE 1 TABLET BY MOUTH EVERY DAY, Disp: 90 tablet, Rfl: 0    progesterone (PROMETRIUM) 100 MG capsule, Take 100 mg by mouth nightly., Disp: , Rfl:     valsartan (DIOVAN) 80 MG tablet, Take 1 tablet (80 mg total) by mouth once daily., Disp: 30 tablet, Rfl: 1    wheat dextrin (BENEFIBER CLEAR SF, DEXTRIN, ORAL), Take by mouth. 2 scoops every other day, Disp: , Rfl:     MAGNESIUM CITRATE ORAL, Take by mouth. (Patient not taking: Reported on 12/17/2024), Disp: , Rfl:   Does patient have record of home blood pressure readings no. Readings have been averaging N/A.   Last dose of blood pressure medication was taken at 12/17/24 2130.  Patient is asymptomatic.   Complains of nothing.

## 2024-12-17 NOTE — TELEPHONE ENCOUNTER
Care Due:                  Date            Visit Type   Department     Provider  --------------------------------------------------------------------------------                                MYCHART                              ANNUAL                              CHECKUP/PHY  [facility]    Sury Stewart  Last Visit: 11-      S            MEDICINE       Anger  Next Visit: None Scheduled  None         None Found                                                            Last  Test          Frequency    Reason                     Performed    Due Date  --------------------------------------------------------------------------------    CMP.........  12 months..  valsartan................  Not Found    Overdue    [employer] Embedded Care Due Messages. Reference number: [id number].   12/17/2024 10:34:23 AM CST

## 2024-12-17 NOTE — PATIENT INSTRUCTIONS
Therapeutic Lifestyle Changes   LIFESTYLE CHANGE RECOMMENDATION APPROXIMATE REDUCTION IN SBP   Weight reduction Maintain a normal body weight                      (BMI 19-25) 5-20 mm Hg per 10 kg lost   Dash diet Consume a diet rich in fruits, vegetables, and low-fat dairy products with a reduced content of saturated fat and total fat 8-14 mg Hg   Low-sodium diet Consume <2400 mg of sodium per day 2-8 mg Hg   Increase physical activity Regular aerobic physical activity (i.e. brisk walking at least 40 minutes/session 3-4 days a week) 4-9 mm Hg   Limit alcohol consumption Less than 2 drinks/day in most men or less than 1 drink/day in women and lighter weight persons (1 drink = 12 oz. Beer, 5 oz. Wine, 1.5 oz. hard alcohol) 2-4 mm Hg   Smoking cessation Quit Smoking (Not reported) - known to reduce risk of developing cardiovascular disease.

## 2024-12-18 LAB
OHS CV AF BURDEN PERCENT: < 1
OHS CV DC REMOTE DEVICE TYPE: NORMAL

## 2024-12-18 RX ORDER — VALSARTAN 80 MG/1
80 TABLET ORAL DAILY
Qty: 90 TABLET | Refills: 1 | OUTPATIENT
Start: 2024-12-18

## 2024-12-18 RX ORDER — VALSARTAN 80 MG/1
80 TABLET ORAL DAILY
Qty: 90 TABLET | Refills: 1 | Status: SHIPPED | OUTPATIENT
Start: 2024-12-18 | End: 2025-12-18

## 2024-12-18 RX ORDER — METOPROLOL SUCCINATE 50 MG/1
TABLET, EXTENDED RELEASE ORAL
Qty: 90 TABLET | Refills: 3 | Status: SHIPPED | OUTPATIENT
Start: 2024-12-18

## 2024-12-18 NOTE — TELEPHONE ENCOUNTER
Refill Routing Note   Medication(s) are not appropriate for processing by [facility] for the following reason(s):        Med affordability  Required labs outdated    [facility] action(s):  Defer   Requires labs : Yes      Medication Therapy Plan: Pharmacy comment: REQUEST FOR 90 DAYS PRESCRIPTION.      Appointments  past 12m or future 3m with PCP    Date Provider   Last Visit   11/25/2024 Sury Stewart MD   Next Visit   12/17/2024 Sury Stewart MD   ED visits in past 90 days: 0        Note composed:6:31 PM 12/17/2024

## 2024-12-18 NOTE — TELEPHONE ENCOUNTER
Refill Decision Note   Paulina Agustin  is requesting a refill authorization.  Brief Assessment and Rationale for Refill:  Approve     Medication Therapy Plan:         Comments:     Note composed:6:24 AM 12/18/2024

## 2024-12-18 NOTE — TELEPHONE ENCOUNTER
No care due was identified.  [facility] Embedded Care Due Messages. Reference number: [id number].   12/18/2024 12:26:00 AM CST

## 2024-12-23 ENCOUNTER — PATIENT MESSAGE (OUTPATIENT)
Dept: FAMILY MEDICINE | Facility: CLINIC | Age: 69
End: 2024-12-23
Payer: MEDICARE

## 2025-01-06 ENCOUNTER — PATIENT OUTREACH (OUTPATIENT)
Dept: ADMINISTRATIVE | Facility: HOSPITAL | Age: 70
End: 2025-01-06
Payer: MEDICARE

## 2025-01-09 ENCOUNTER — HOSPITAL ENCOUNTER (OUTPATIENT)
Dept: CARDIOLOGY | Facility: HOSPITAL | Age: 70
Discharge: HOME | End: 2025-01-09
Attending: INTERNAL MEDICINE
Payer: MEDICARE

## 2025-01-09 DIAGNOSIS — I49.8 OTHER SPECIFIED CARDIAC ARRHYTHMIAS: ICD-10-CM

## 2025-01-09 PROCEDURE — 93298 REM INTERROG DEV EVAL SCRMS: CPT | Mod: PO | Performed by: INTERNAL MEDICINE

## 2025-01-09 PROCEDURE — 93298 REM INTERROG DEV EVAL SCRMS: CPT | Mod: 26,,, | Performed by: INTERNAL MEDICINE

## 2025-01-13 LAB
OHS CV AF BURDEN PERCENT: < 1
OHS CV DC REMOTE DEVICE TYPE: NORMAL

## 2025-01-29 ENCOUNTER — HOSPITAL ENCOUNTER (OUTPATIENT)
Dept: CARDIOLOGY | Facility: HOSPITAL | Age: 70
Discharge: HOME | End: 2025-01-29
Attending: INTERNAL MEDICINE
Payer: MEDICARE

## 2025-01-29 VITALS — BODY MASS INDEX: 28.53 KG/M2 | HEIGHT: 63 IN | WEIGHT: 161 LBS

## 2025-01-29 DIAGNOSIS — R00.2 PALPITATIONS: ICD-10-CM

## 2025-01-29 PROCEDURE — 93306 TTE W/DOPPLER COMPLETE: CPT | Mod: 26,,, | Performed by: INTERNAL MEDICINE

## 2025-01-29 PROCEDURE — 93306 TTE W/DOPPLER COMPLETE: CPT | Mod: PO

## 2025-01-30 ENCOUNTER — TELEPHONE (OUTPATIENT)
Dept: CARDIOLOGY | Facility: CLINIC | Age: 70
End: 2025-01-30
Payer: MEDICARE

## 2025-01-30 LAB
ASCENDING AORTA: 2.87 CM
AV INDEX (PROSTH): 0.76
AV MEAN GRADIENT: 5 MMHG
AV PEAK GRADIENT: 9 MMHG
AV VALVE AREA BY VELOCITY RATIO: 2.3 CM²
AV VALVE AREA: 2.4 CM²
AV VELOCITY RATIO: 0.73
BSA FOR ECHO PROCEDURE: 1.8 M2
CV ECHO LV RWT: 0.44 CM
DOP CALC AO PEAK VEL: 1.5 M/S
DOP CALC AO VTI: 34.8 CM
DOP CALC LVOT AREA: 3.1 CM2
DOP CALC LVOT DIAMETER: 2 CM
DOP CALC LVOT PEAK VEL: 1.1 M/S
DOP CALC LVOT STROKE VOLUME: 82.9 CM3
DOP CALCLVOT PEAK VEL VTI: 26.4 CM
E WAVE DECELERATION TIME: 190 MSEC
E/A RATIO: 0.83
E/E' RATIO: 9 M/S
ECHO LV POSTERIOR WALL: 0.8 CM (ref 0.6–1.1)
EJECTION FRACTION: 60 %
FRACTIONAL SHORTENING: 52.8 % (ref 28–44)
INTERVENTRICULAR SEPTUM: 0.7 CM (ref 0.6–1.1)
IVRT: 77 MSEC
LEFT ATRIUM AREA SYSTOLIC (APICAL 2 CHAMBER): 21.57 CM2
LEFT ATRIUM AREA SYSTOLIC (APICAL 4 CHAMBER): 13.84 CM2
LEFT ATRIUM SIZE: 3 CM
LEFT ATRIUM VOLUME INDEX MOD: 30 ML/M2
LEFT ATRIUM VOLUME MOD: 53 ML
LEFT INTERNAL DIMENSION IN SYSTOLE: 1.7 CM (ref 2.1–4)
LEFT VENTRICLE DIASTOLIC VOLUME INDEX: 30.41 ML/M2
LEFT VENTRICLE DIASTOLIC VOLUME: 53.53 ML
LEFT VENTRICLE END SYSTOLIC VOLUME APICAL 2 CHAMBER: 72.85 ML
LEFT VENTRICLE END SYSTOLIC VOLUME APICAL 4 CHAMBER: 34.24 ML
LEFT VENTRICLE MASS INDEX: 41 G/M2
LEFT VENTRICLE SYSTOLIC VOLUME INDEX: 5 ML/M2
LEFT VENTRICLE SYSTOLIC VOLUME: 8.88 ML
LEFT VENTRICULAR INTERNAL DIMENSION IN DIASTOLE: 3.6 CM (ref 3.5–6)
LEFT VENTRICULAR MASS: 72.1 G
LV LATERAL E/E' RATIO: 7.9 M/S
LV SEPTAL E/E' RATIO: 9.9 M/S
LVED V (TEICH): 53.53 ML
LVES V (TEICH): 8.88 ML
LVOT MG: 2.84 MMHG
LVOT MV: 0.8 CM/S
MV PEAK A VEL: 0.95 M/S
MV PEAK E VEL: 0.79 M/S
OHS CV RV/LV RATIO: 0.97 CM
PISA TR MAX VEL: 2.3 M/S
PULM VEIN S/D RATIO: 2.29
PV PEAK D VEL: 0.31 M/S
PV PEAK S VEL: 0.71 M/S
RA PRESSURE ESTIMATED: 3 MMHG
RA VOL SYS: 36.4 ML
RIGHT ATRIAL AREA: 13.9 CM2
RIGHT ATRIUM VOLUME AREA LENGTH APICAL 4 CHAMBER: 33.77 ML
RIGHT VENTRICLE DIASTOLIC BASEL DIMENSION: 3.5 CM
RIGHT VENTRICLE DIASTOLIC LENGTH: 7.4 CM
RIGHT VENTRICLE DIASTOLIC MID DIMENSION: 2.2 CM
RIGHT VENTRICULAR END-DIASTOLIC DIMENSION: 3.45 CM
RIGHT VENTRICULAR LENGTH IN DIASTOLE (APICAL 4-CHAMBER VIEW): 7.42 CM
RV MID DIAMA: 2.17 CM
RV TB RVSP: 5 MMHG
RV TISSUE DOPPLER FREE WALL SYSTOLIC VELOCITY 1 (APICAL 4 CHAMBER VIEW): 12.06 CM/S
SINUS: 3.16 CM
STJ: 2.7 CM
TDI LATERAL: 0.1 M/S
TDI SEPTAL: 0.08 M/S
TDI: 0.09 M/S
TR MAX PG: 21 MMHG
TRICUSPID ANNULAR PLANE SYSTOLIC EXCURSION: 2.53 CM
TV REST PULMONARY ARTERY PRESSURE: 24 MMHG
Z-SCORE OF LEFT VENTRICULAR DIMENSION IN END DIASTOLE: -3.01
Z-SCORE OF LEFT VENTRICULAR DIMENSION IN END SYSTOLE: -4.52

## 2025-01-31 ENCOUNTER — TELEPHONE (OUTPATIENT)
Dept: CARDIOLOGY | Facility: CLINIC | Age: 70
End: 2025-01-31
Payer: MEDICARE

## 2025-02-09 ENCOUNTER — HOSPITAL ENCOUNTER (OUTPATIENT)
Dept: CARDIOLOGY | Facility: HOSPITAL | Age: 70
Discharge: HOME | End: 2025-02-09
Attending: INTERNAL MEDICINE
Payer: MEDICARE

## 2025-02-09 ENCOUNTER — CLINICAL SUPPORT (OUTPATIENT)
Dept: CARDIOLOGY | Facility: HOSPITAL | Age: 70
End: 2025-02-09
Payer: MEDICARE

## 2025-02-09 DIAGNOSIS — I49.8 OTHER SPECIFIED CARDIAC ARRHYTHMIAS: ICD-10-CM

## 2025-02-09 PROCEDURE — 93298 REM INTERROG DEV EVAL SCRMS: CPT | Mod: PO | Performed by: INTERNAL MEDICINE

## 2025-02-12 ENCOUNTER — HOSPITAL ENCOUNTER (OUTPATIENT)
Dept: RADIOLOGY | Facility: HOSPITAL | Age: 70
Discharge: HOME | End: 2025-02-12
Attending: FAMILY MEDICINE
Payer: MEDICARE

## 2025-02-12 DIAGNOSIS — K86.2 PANCREAS CYST: ICD-10-CM

## 2025-02-12 PROCEDURE — 74183 MRI ABD W/O CNTR FLWD CNTR: CPT | Mod: TC,PO

## 2025-02-12 PROCEDURE — A9585 GADOBUTROL INJECTION: HCPCS | Mod: PO | Performed by: FAMILY MEDICINE

## 2025-02-12 PROCEDURE — 74183 MRI ABD W/O CNTR FLWD CNTR: CPT | Mod: 26,,, | Performed by: STUDENT IN AN ORGANIZED HEALTH CARE EDUCATION/TRAINING PROGRAM

## 2025-02-12 PROCEDURE — 25500020 PHARM REV CODE 255: Mod: PO | Performed by: FAMILY MEDICINE

## 2025-02-12 RX ORDER — GADOBUTROL 604.72 MG/ML
7 INJECTION INTRAVENOUS
Status: COMPLETED | OUTPATIENT
Start: 2025-02-12 | End: 2025-02-12

## 2025-02-12 RX ADMIN — GADOBUTROL 7 ML: 604.72 INJECTION INTRAVENOUS at 11:02

## 2025-02-17 ENCOUNTER — PATIENT MESSAGE (OUTPATIENT)
Dept: FAMILY MEDICINE | Facility: CLINIC | Age: 70
End: 2025-02-17
Payer: MEDICARE

## 2025-02-17 LAB
OHS CV AF BURDEN PERCENT: < 1
OHS CV DC REMOTE DEVICE TYPE: NORMAL

## 2025-02-22 DIAGNOSIS — Z00.00 ENCOUNTER FOR MEDICARE ANNUAL WELLNESS EXAM: ICD-10-CM

## 2025-03-12 ENCOUNTER — HOSPITAL ENCOUNTER (OUTPATIENT)
Dept: CARDIOLOGY | Facility: HOSPITAL | Age: 70
Discharge: HOME | End: 2025-03-12
Attending: INTERNAL MEDICINE
Payer: COMMERCIAL

## 2025-03-12 ENCOUNTER — CLINICAL SUPPORT (OUTPATIENT)
Dept: CARDIOLOGY | Facility: HOSPITAL | Age: 70
End: 2025-03-12
Payer: COMMERCIAL

## 2025-03-12 DIAGNOSIS — I49.8 OTHER SPECIFIED CARDIAC ARRHYTHMIAS: ICD-10-CM

## 2025-03-12 PROCEDURE — 93298 REM INTERROG DEV EVAL SCRMS: CPT | Mod: 26,,, | Performed by: INTERNAL MEDICINE

## 2025-03-17 LAB
OHS CV AF BURDEN PERCENT: < 1
OHS CV DC REMOTE DEVICE TYPE: NORMAL

## 2025-04-12 ENCOUNTER — CLINICAL SUPPORT (OUTPATIENT)
Dept: CARDIOLOGY | Facility: HOSPITAL | Age: 70
End: 2025-04-12
Payer: COMMERCIAL

## 2025-04-12 ENCOUNTER — HOSPITAL ENCOUNTER (OUTPATIENT)
Dept: CARDIOLOGY | Facility: HOSPITAL | Age: 70
Discharge: HOME | End: 2025-04-12
Attending: INTERNAL MEDICINE
Payer: MEDICARE

## 2025-04-12 DIAGNOSIS — I49.8 OTHER SPECIFIED CARDIAC ARRHYTHMIAS: ICD-10-CM

## 2025-04-12 PROCEDURE — 93298 REM INTERROG DEV EVAL SCRMS: CPT | Mod: 26,,, | Performed by: INTERNAL MEDICINE

## 2025-04-12 PROCEDURE — 93298 REM INTERROG DEV EVAL SCRMS: CPT | Mod: PO | Performed by: INTERNAL MEDICINE

## 2025-04-21 LAB
OHS CV AF BURDEN PERCENT: < 1
OHS CV DC REMOTE DEVICE TYPE: NORMAL

## 2025-05-13 ENCOUNTER — CLINICAL SUPPORT (OUTPATIENT)
Dept: CARDIOLOGY | Facility: HOSPITAL | Age: 70
End: 2025-05-13
Payer: COMMERCIAL

## 2025-05-13 ENCOUNTER — HOSPITAL ENCOUNTER (OUTPATIENT)
Dept: CARDIOLOGY | Facility: HOSPITAL | Age: 70
Discharge: HOME | End: 2025-05-13
Attending: INTERNAL MEDICINE
Payer: COMMERCIAL

## 2025-05-13 DIAGNOSIS — I49.8 OTHER SPECIFIED CARDIAC ARRHYTHMIAS: ICD-10-CM

## 2025-05-13 PROCEDURE — 93298 REM INTERROG DEV EVAL SCRMS: CPT | Mod: PO | Performed by: INTERNAL MEDICINE

## 2025-05-13 PROCEDURE — 93298 REM INTERROG DEV EVAL SCRMS: CPT | Mod: 26,,, | Performed by: INTERNAL MEDICINE

## 2025-05-19 LAB
OHS CV AF BURDEN PERCENT: < 1
OHS CV DC REMOTE DEVICE TYPE: NORMAL

## 2025-06-11 DIAGNOSIS — I10 HYPERTENSION, ESSENTIAL: Primary | ICD-10-CM

## 2025-06-11 RX ORDER — VALSARTAN 80 MG/1
80 TABLET ORAL DAILY
Qty: 90 TABLET | Refills: 0 | Status: SHIPPED | OUTPATIENT
Start: 2025-06-11

## 2025-06-11 NOTE — TELEPHONE ENCOUNTER
Please advise message from refill pool below.   Care due:  Test          Frequency    Reason                     Performed    Due Date  --------------------------------------------------------------------------------     K...........  12 months..  valsartan................  07- 07-

## 2025-06-11 NOTE — TELEPHONE ENCOUNTER
Provider Staff:  Action required for this patient    Requires labs      Please see care gap opportunities below in Care Due Message.    Thanks!  [facility]     Appointments      Date Provider   Last Visit   11/25/2024 Sury Stewart MD   Next Visit   Visit date not found Sury Stewart MD     Refill Decision Note   Paulina Agustin  is requesting a refill authorization.  Brief Assessment and Rationale for Refill:  Approve     Medication Therapy Plan:         Comments:     Note composed:7:10 AM 06/11/2025

## 2025-06-11 NOTE — TELEPHONE ENCOUNTER
Care Due:                  Date            Visit Type   Department     Provider  --------------------------------------------------------------------------------                                MYCHART                              ANNUAL                              CHECKUP/PHY  [facility]    Sury Stewart  Last Visit: 11-      S            MEDICINE       Anger  Next Visit: None Scheduled  None         None Found                                                            Last  Test          Frequency    Reason                     Performed    Due Date  --------------------------------------------------------------------------------    K...........  12 months..  valsartan................  07- 07-    Health [facility] Embedded Care Due Messages. Reference number: [id number].   6/11/2025 12:30:09 AM CDT

## 2025-06-13 ENCOUNTER — CLINICAL SUPPORT (OUTPATIENT)
Dept: CARDIOLOGY | Facility: HOSPITAL | Age: 70
End: 2025-06-13
Payer: COMMERCIAL

## 2025-06-13 ENCOUNTER — HOSPITAL ENCOUNTER (OUTPATIENT)
Dept: CARDIOLOGY | Facility: HOSPITAL | Age: 70
Discharge: HOME | End: 2025-06-13
Attending: INTERNAL MEDICINE
Payer: MEDICARE

## 2025-06-13 DIAGNOSIS — I49.8 OTHER SPECIFIED CARDIAC ARRHYTHMIAS: ICD-10-CM

## 2025-06-13 PROCEDURE — 93298 REM INTERROG DEV EVAL SCRMS: CPT | Mod: PO | Performed by: INTERNAL MEDICINE

## 2025-06-13 PROCEDURE — 93298 REM INTERROG DEV EVAL SCRMS: CPT | Mod: 26,,, | Performed by: INTERNAL MEDICINE

## 2025-06-24 LAB
OHS CV AF BURDEN PERCENT: < 1
OHS CV DC REMOTE DEVICE TYPE: NORMAL

## 2025-06-27 RX ORDER — AZELASTINE 1 MG/ML
SPRAY, METERED NASAL
Qty: 90 ML | Refills: 1 | Status: SHIPPED | OUTPATIENT
Start: 2025-06-27

## 2025-06-27 NOTE — TELEPHONE ENCOUNTER
Refill Decision Note   Paulina Agustin  is requesting a refill authorization.  Brief Assessment and Rationale for Refill:  Approve     Medication Therapy Plan:         Comments:     Note composed:4:53 AM 06/27/2025

## 2025-06-27 NOTE — TELEPHONE ENCOUNTER
No care due was identified.  [facility] Embedded Care Due Messages. Reference number: [id number].   6/27/2025 12:16:17 AM CDT

## 2025-07-14 ENCOUNTER — CLINICAL SUPPORT (OUTPATIENT)
Dept: CARDIOLOGY | Facility: HOSPITAL | Age: 70
End: 2025-07-14
Payer: MEDICARE

## 2025-07-14 ENCOUNTER — HOSPITAL ENCOUNTER (OUTPATIENT)
Dept: CARDIOLOGY | Facility: HOSPITAL | Age: 70
Discharge: HOME | End: 2025-07-14
Attending: INTERNAL MEDICINE
Payer: COMMERCIAL

## 2025-07-14 DIAGNOSIS — I49.8 OTHER SPECIFIED CARDIAC ARRHYTHMIAS: ICD-10-CM

## 2025-07-14 LAB
OHS CV AF BURDEN PERCENT: < 1
OHS CV DC REMOTE DEVICE TYPE: NORMAL

## 2025-07-14 PROCEDURE — 93298 REM INTERROG DEV EVAL SCRMS: CPT | Mod: PO | Performed by: INTERNAL MEDICINE

## 2025-07-14 PROCEDURE — 93298 REM INTERROG DEV EVAL SCRMS: CPT | Mod: 26,,, | Performed by: INTERNAL MEDICINE

## 2025-08-14 ENCOUNTER — CLINICAL SUPPORT (OUTPATIENT)
Dept: CARDIOLOGY | Facility: HOSPITAL | Age: 70
End: 2025-08-14
Payer: MEDICARE

## 2025-08-14 ENCOUNTER — HOSPITAL ENCOUNTER (OUTPATIENT)
Dept: CARDIOLOGY | Facility: HOSPITAL | Age: 70
Discharge: HOME | End: 2025-08-14
Attending: INTERNAL MEDICINE
Payer: MEDICARE

## 2025-08-14 DIAGNOSIS — I49.8 OTHER SPECIFIED CARDIAC ARRHYTHMIAS: ICD-10-CM

## 2025-08-14 PROCEDURE — 93298 REM INTERROG DEV EVAL SCRMS: CPT | Mod: PO | Performed by: INTERNAL MEDICINE

## 2025-08-14 PROCEDURE — 93298 REM INTERROG DEV EVAL SCRMS: CPT | Mod: 26,,, | Performed by: INTERNAL MEDICINE

## 2025-08-18 LAB
OHS CV AF BURDEN PERCENT: < 1
OHS CV DC REMOTE DEVICE TYPE: NORMAL